# Patient Record
Sex: FEMALE | Race: BLACK OR AFRICAN AMERICAN | NOT HISPANIC OR LATINO | URBAN - METROPOLITAN AREA
[De-identification: names, ages, dates, MRNs, and addresses within clinical notes are randomized per-mention and may not be internally consistent; named-entity substitution may affect disease eponyms.]

---

## 2020-07-25 ENCOUNTER — TELEPHONE ENCOUNTER (OUTPATIENT)
Dept: URBAN - METROPOLITAN AREA CLINIC 13 | Facility: CLINIC | Age: 45
End: 2020-07-25

## 2020-07-25 RX ORDER — PANTOPRAZOLE SODIUM 40 MG/1
TAKE 1 TABLET BY MOUTH TWICE A DAY TABLET, DELAYED RELEASE ORAL
Qty: 60 | Refills: 11 | OUTPATIENT
Start: 2019-05-02 | End: 2019-05-30

## 2020-07-25 RX ORDER — PANTOPRAZOLE SODIUM 40 MG/1
TAKE 1 TABLET BY MOUTH TWICE A DAY TABLET, DELAYED RELEASE ORAL
Qty: 180 | Refills: 4 | OUTPATIENT
Start: 2019-05-30 | End: 2019-08-09

## 2020-07-25 RX ORDER — POLYETHYLENE GLYCOL 3350, SODIUM CHLORIDE, SODIUM BICARBONATE AND POTASSIUM CHLORIDE WITH LEMON FLAVOR 420; 11.2; 5.72; 1.48 G/4L; G/4L; G/4L; G/4L
TAKE 1/2 GALLON AT 5:00 PM DAY BEFORE PROCEDURE, TAKE SECOND 1/2 OF GALLON 6 HRS PRIOR TO PROCEDURE POWDER, FOR SOLUTION ORAL
Qty: 1 | Refills: 0 | OUTPATIENT
Start: 2018-10-05 | End: 2019-01-04

## 2020-07-25 RX ORDER — ONDANSETRON 8 MG/1
TAKE 1 MG EVERY 8 HOURS PRN NAUSEA TABLET, ORALLY DISINTEGRATING ORAL
Qty: 45 | Refills: 1 | OUTPATIENT
Start: 2018-06-06 | End: 2019-08-09

## 2020-07-25 RX ORDER — EPOETIN ALFA 20000 [IU]/ML
INJECT 0.5 ML 3 TIMES WEEKLY AS DIRECTED SOLUTION INTRAVENOUS; SUBCUTANEOUS
Refills: 0 | OUTPATIENT
Start: 2009-06-17 | End: 2019-08-09

## 2020-07-25 RX ORDER — POLYETHYLENE GLYCOL 3350, SODIUM CHLORIDE, SODIUM BICARBONATE AND POTASSIUM CHLORIDE WITH LEMON FLAVOR 420; 11.2; 5.72; 1.48 G/4L; G/4L; G/4L; G/4L
DRINK 32 OUNCES AT 5PM DAY BEFORE PROCEDURE AND 6 HOURS PRIOR POWDER, FOR SOLUTION ORAL
Qty: 1 | Refills: 0 | OUTPATIENT
Start: 2013-10-24 | End: 2015-11-20

## 2020-07-25 RX ORDER — DICYCLOMINE HYDROCHLORIDE 10 MG/1
TAKE 2 CAPSULE EVERY 6 HOURS PRN ABDOMINAL PAIN CAPSULE ORAL
Qty: 45 | Refills: 11 | OUTPATIENT
Start: 2015-11-20 | End: 2017-11-17

## 2020-07-25 RX ORDER — OMEPRAZOLE 40 MG/1
TAKE 1 CAPSULE TWICE DAILY BEFORE BREAKFAST AND SUPPER CAPSULE, DELAYED RELEASE ORAL
Qty: 60 | Refills: 11 | OUTPATIENT
Start: 2013-06-13 | End: 2019-08-09

## 2020-07-25 RX ORDER — DEXTROSE 4 G
TAKE 1 TABLET AT BEDTIME TABLET,CHEWABLE ORAL
Refills: 0 | OUTPATIENT
End: 2019-08-09

## 2020-07-25 RX ORDER — HYOSCYAMINE SULFATE 0.12 MG/1
PLACE 1 TABLET EVERY 6 HOURS PRN TABLET, ORALLY DISINTEGRATING ORAL
Qty: 50 | Refills: 1 | OUTPATIENT
Start: 2018-06-06 | End: 2019-01-04

## 2020-07-25 RX ORDER — PANTOPRAZOLE SODIUM 40 MG/1
TAKE 1 TABLET BY MOUTH EVERY DAY TABLET, DELAYED RELEASE ORAL
Qty: 30 | Refills: 11 | OUTPATIENT
Start: 2019-11-12 | End: 2019-12-04

## 2020-07-25 RX ORDER — FOLIC ACID/VIT B COMPLEX AND C 0.8 MG
TAKE AS DIRECTED TABLET ORAL
Refills: 0 | OUTPATIENT
End: 2015-11-20

## 2020-07-25 RX ORDER — SUCRALFATE 1 G/1
DISSOLVE 1 TABLET IN 1-2 TABLESPOONS OF WATER AND TAKE 30 MINUTES BEFORE MEALS AND AT BEDTIME TABLET ORAL
Qty: 60 | Refills: 3 | OUTPATIENT
Start: 2019-04-12 | End: 2019-08-09

## 2020-07-26 ENCOUNTER — TELEPHONE ENCOUNTER (OUTPATIENT)
Dept: URBAN - METROPOLITAN AREA CLINIC 13 | Facility: CLINIC | Age: 45
End: 2020-07-26

## 2020-07-26 RX ORDER — METHYLPREDNISOLONE 4 MG/1
TABLET ORAL
Qty: 21 | Refills: 0 | Status: ACTIVE | COMMUNITY
Start: 2019-01-03

## 2020-07-26 RX ORDER — FAMOTIDINE 40 MG/1
TAKE 1 TABLET BEDTIME TABLET ORAL
Qty: 90 | Refills: 3 | Status: ACTIVE | COMMUNITY
Start: 2019-11-12

## 2020-07-26 RX ORDER — DOXYCYCLINE 100 MG/1
CAPSULE ORAL
Qty: 20 | Refills: 0 | Status: ACTIVE | COMMUNITY
Start: 2013-12-30

## 2020-07-26 RX ORDER — HYDROCODONE BITARTRATE AND ACETAMINOPHEN 5; 325 MG/1; MG/1
TABLET ORAL
Qty: 15 | Refills: 0 | Status: ACTIVE | COMMUNITY
Start: 2019-02-05

## 2020-07-26 RX ORDER — PANTOPRAZOLE SODIUM 40 MG/1
TAKE 1 TABLET BY MOUTH EVERY DAY TABLET, DELAYED RELEASE ORAL
Qty: 90 | Refills: 4 | Status: ACTIVE | COMMUNITY
Start: 2019-12-04

## 2020-07-26 RX ORDER — OMEPRAZOLE 20 MG/1
CAPSULE, DELAYED RELEASE ORAL
Qty: 30 | Refills: 0 | Status: ACTIVE | COMMUNITY
Start: 2019-04-13

## 2020-07-26 RX ORDER — SEVELAMER CARBONATE 800 MG/1
TABLET, FILM COATED ORAL
Qty: 990 | Refills: 0 | Status: ACTIVE | COMMUNITY
Start: 2019-03-05

## 2020-07-26 RX ORDER — AZITHROMYCIN DIHYDRATE 250 MG/1
TABLET, FILM COATED ORAL
Qty: 6 | Refills: 0 | Status: ACTIVE | COMMUNITY
Start: 2014-12-11

## 2020-07-26 RX ORDER — CARVEDILOL 6.25 MG/1
TABLET, FILM COATED ORAL
Qty: 60 | Refills: 0 | Status: ACTIVE | COMMUNITY
Start: 2017-04-22

## 2020-07-26 RX ORDER — ZOLPIDEM TARTRATE 10 MG/1
TABLET, FILM COATED ORAL
Qty: 30 | Refills: 0 | Status: ACTIVE | COMMUNITY
Start: 2014-07-15

## 2020-07-26 RX ORDER — DEXTROSE 4 G
TABLET,CHEWABLE ORAL
Qty: 30 | Refills: 0 | Status: ACTIVE | COMMUNITY
Start: 2016-03-20

## 2020-07-26 RX ORDER — AMLODIPINE BESYLATE 10 MG/1
TAKE 1 TABLET DAILY TABLET ORAL
Refills: 0 | Status: ACTIVE | COMMUNITY

## 2020-07-26 RX ORDER — CARVEDILOL 6.25 MG/1
TABLET, FILM COATED ORAL
Qty: 60 | Refills: 0 | Status: ACTIVE | COMMUNITY
Start: 2017-03-26

## 2020-07-26 RX ORDER — BUTALBITAL, ACETAMINOPHEN, AND CAFFEINE 50; 325; 40 MG/1; MG/1; MG/1
TABLET ORAL
Qty: 30 | Refills: 0 | Status: ACTIVE | COMMUNITY
Start: 2017-12-21

## 2020-07-26 RX ORDER — ZOLPIDEM TARTRATE 10 MG/1
TABLET, FILM COATED ORAL
Qty: 30 | Refills: 0 | Status: ACTIVE | COMMUNITY
Start: 2018-06-29

## 2020-07-26 RX ORDER — ZOLPIDEM TARTRATE 10 MG/1
TABLET, FILM COATED ORAL
Qty: 30 | Refills: 0 | Status: ACTIVE | COMMUNITY
Start: 2019-07-29

## 2020-07-26 RX ORDER — SULFAMETHOXAZOLE AND TRIMETHOPRIM 800; 160 MG/1; MG/1
TABLET ORAL
Qty: 28 | Refills: 0 | Status: ACTIVE | COMMUNITY
Start: 2020-03-12

## 2020-07-26 RX ORDER — OMEPRAZOLE 20 MG/1
CAPSULE, DELAYED RELEASE ORAL
Qty: 30 | Refills: 0 | Status: ACTIVE | COMMUNITY
Start: 2018-05-16

## 2020-07-26 RX ORDER — CICLOPIROX 80 MG/ML
SOLUTION TOPICAL
Qty: 7 | Refills: 0 | Status: ACTIVE | COMMUNITY
Start: 2018-10-26

## 2020-07-26 RX ORDER — OMEPRAZOLE 20 MG/1
CAPSULE, DELAYED RELEASE ORAL
Qty: 30 | Refills: 0 | Status: ACTIVE | COMMUNITY
Start: 2018-04-20

## 2020-07-26 RX ORDER — ZOLPIDEM TARTRATE 10 MG/1
TABLET, FILM COATED ORAL
Qty: 30 | Refills: 0 | Status: ACTIVE | COMMUNITY
Start: 2018-03-05

## 2020-07-26 RX ORDER — METHYLPREDNISOLONE 4 MG/1
TABLET ORAL
Qty: 21 | Refills: 0 | Status: ACTIVE | COMMUNITY
Start: 2017-11-06

## 2020-07-26 RX ORDER — LORAZEPAM 0.5 MG/1
TABLET ORAL
Qty: 10 | Refills: 0 | Status: ACTIVE | COMMUNITY
Start: 2014-01-12

## 2020-07-26 RX ORDER — CARVEDILOL 6.25 MG/1
TABLET, FILM COATED ORAL
Qty: 30 | Refills: 0 | Status: ACTIVE | COMMUNITY
Start: 2015-01-19

## 2020-07-26 RX ORDER — SEVELAMER CARBONATE 800 MG/1
TABLET, FILM COATED ORAL
Qty: 1350 | Refills: 0 | Status: ACTIVE | COMMUNITY
Start: 2017-03-02

## 2020-07-26 RX ORDER — HYDROCODONE BITARTRATE AND ACETAMINOPHEN 5; 325 MG/1; MG/1
TABLET ORAL
Qty: 15 | Refills: 0 | Status: ACTIVE | COMMUNITY
Start: 2014-04-16

## 2020-07-26 RX ORDER — NEOMYCIN SULFATE, POLYMYXIN B SULFATE AND DEXAMETHASONE 3.5; 10000; 1 MG/ML; [USP'U]/ML; MG/ML
SUSPENSION/ DROPS OPHTHALMIC
Qty: 5 | Refills: 0 | Status: ACTIVE | COMMUNITY
Start: 2018-12-17

## 2020-07-26 RX ORDER — DOCUSATE SODIUM 100 MG/1
TAKE 1 CAPSULE BY MOUTH EVERY DAY CAPSULE, LIQUID FILLED ORAL
Qty: 30 | Refills: 0 | Status: ACTIVE | COMMUNITY
Start: 2019-10-08

## 2020-07-26 RX ORDER — PROMETHAZINE HYDROCHLORIDE 12.5 MG/1
TABLET ORAL
Qty: 60 | Refills: 0 | Status: ACTIVE | COMMUNITY
Start: 2018-06-11

## 2020-07-26 RX ORDER — FLUTICASONE PROPIONATE 50 UG/1
SPRAY, METERED NASAL
Qty: 16 | Refills: 0 | Status: ACTIVE | COMMUNITY
Start: 2016-06-10

## 2020-07-26 RX ORDER — AZITHROMYCIN DIHYDRATE 250 MG/1
TABLET, FILM COATED ORAL
Qty: 6 | Refills: 0 | Status: ACTIVE | COMMUNITY
Start: 2017-12-06

## 2020-07-26 RX ORDER — SEVELAMER CARBONATE 800 MG/1
TABLET, FILM COATED ORAL
Qty: 990 | Refills: 0 | Status: ACTIVE | COMMUNITY
Start: 2018-02-06

## 2020-07-26 RX ORDER — HYDROCODONE BITARTRATE AND ACETAMINOPHEN 5; 325 MG/1; MG/1
TABLET ORAL
Qty: 60 | Refills: 0 | Status: ACTIVE | COMMUNITY
Start: 2017-02-10

## 2020-07-26 RX ORDER — PROMETHAZINE HYDROCHLORIDE 25 MG/1
TABLET ORAL
Qty: 20 | Refills: 0 | Status: ACTIVE | COMMUNITY
Start: 2019-05-10

## 2020-07-26 RX ORDER — ZOLPIDEM TARTRATE 10 MG/1
TABLET, FILM COATED ORAL
Qty: 30 | Refills: 0 | Status: ACTIVE | COMMUNITY
Start: 2018-09-11

## 2020-07-26 RX ORDER — ZOLPIDEM TARTRATE 10 MG/1
TABLET, FILM COATED ORAL
Qty: 30 | Refills: 0 | Status: ACTIVE | COMMUNITY
Start: 2012-12-04

## 2020-07-26 RX ORDER — ZOLPIDEM TARTRATE 10 MG/1
TABLET, FILM COATED ORAL
Qty: 30 | Refills: 0 | Status: ACTIVE | COMMUNITY
Start: 2016-03-21

## 2020-07-26 RX ORDER — AZITHROMYCIN DIHYDRATE 250 MG/1
TABLET, FILM COATED ORAL
Qty: 5 | Refills: 0 | Status: ACTIVE | COMMUNITY
Start: 2018-12-20

## 2020-07-26 RX ORDER — SEVELAMER CARBONATE 800 MG/1
TABLET, FILM COATED ORAL
Qty: 450 | Refills: 0 | Status: ACTIVE | COMMUNITY
Start: 2016-04-04

## 2020-07-26 RX ORDER — BUTALBITAL, ACETAMINOPHEN, AND CAFFEINE 50; 325; 40 MG/1; MG/1; MG/1
TAKE 1 TO 2 TABLETS BY MOUTH EVERY 6 HOURS AS NEEDED FOR HEADACHE TABLET ORAL
Qty: 20 | Refills: 0 | Status: ACTIVE | COMMUNITY
Start: 2019-11-07

## 2020-07-26 RX ORDER — FLUTICASONE PROPIONATE 50 UG/1
SPRAY, METERED NASAL
Qty: 48 | Refills: 0 | Status: ACTIVE | COMMUNITY
Start: 2020-02-14

## 2020-07-26 RX ORDER — CARVEDILOL 6.25 MG/1
TABLET, FILM COATED ORAL
Qty: 60 | Refills: 0 | Status: ACTIVE | COMMUNITY
Start: 2017-05-21

## 2020-07-26 RX ORDER — ZOLPIDEM TARTRATE 10 MG/1
TABLET, FILM COATED ORAL
Qty: 30 | Refills: 0 | Status: ACTIVE | COMMUNITY
Start: 2016-01-11

## 2020-07-26 RX ORDER — NYSTATIN 100000 [USP'U]/G
OINTMENT TOPICAL
Qty: 30 | Refills: 0 | Status: ACTIVE | COMMUNITY
Start: 2019-05-10

## 2020-07-26 RX ORDER — ZOLPIDEM TARTRATE 10 MG/1
TABLET, FILM COATED ORAL
Qty: 30 | Refills: 0 | Status: ACTIVE | COMMUNITY
Start: 2013-10-16

## 2020-07-26 RX ORDER — TIZANIDINE 2 MG/1
TABLET ORAL
Qty: 30 | Refills: 0 | Status: ACTIVE | COMMUNITY
Start: 2019-05-17

## 2020-07-26 RX ORDER — BUTALBITAL, ACETAMINOPHEN, AND CAFFEINE 50; 325; 40 MG/1; MG/1; MG/1
TABLET ORAL
Qty: 15 | Refills: 0 | Status: ACTIVE | COMMUNITY
Start: 2015-01-19

## 2020-07-26 RX ORDER — HYDROCODONE BITARTRATE AND ACETAMINOPHEN 7.5; 325 MG/1; MG/1
TABLET ORAL
Qty: 16 | Refills: 0 | Status: ACTIVE | COMMUNITY
Start: 2014-11-14

## 2020-07-26 RX ORDER — DEXTROSE 4 G
TAKE 1 TABLET BY MOUTH EVERY DAY TABLET,CHEWABLE ORAL
Qty: 30 | Refills: 0 | Status: ACTIVE | COMMUNITY
Start: 2018-12-31

## 2020-07-26 RX ORDER — AZITHROMYCIN DIHYDRATE 250 MG/1
TABLET, FILM COATED ORAL
Qty: 6 | Refills: 0 | Status: ACTIVE | COMMUNITY
Start: 2014-01-03

## 2020-07-26 RX ORDER — OMEPRAZOLE 20 MG/1
CAPSULE, DELAYED RELEASE ORAL
Qty: 30 | Refills: 0 | Status: ACTIVE | COMMUNITY
Start: 2019-03-15

## 2020-07-26 RX ORDER — ZOLPIDEM TARTRATE 10 MG/1
TABLET, FILM COATED ORAL
Qty: 30 | Refills: 0 | Status: ACTIVE | COMMUNITY
Start: 2018-02-02

## 2020-07-26 RX ORDER — CICLOPIROX 80 MG/ML
SOLUTION TOPICAL
Qty: 7 | Refills: 0 | Status: ACTIVE | COMMUNITY
Start: 2018-09-11

## 2020-07-26 RX ORDER — TRAMADOL HYDROCHLORIDE 50 MG/1
TABLET ORAL
Qty: 20 | Refills: 0 | Status: ACTIVE | COMMUNITY
Start: 2017-08-28

## 2020-07-26 RX ORDER — ONDANSETRON 4 MG/1
TABLET, ORALLY DISINTEGRATING ORAL
Qty: 14 | Refills: 0 | Status: ACTIVE | COMMUNITY
Start: 2019-11-07

## 2020-07-26 RX ORDER — FLUTICASONE PROPIONATE 50 UG/1
SPRAY, METERED NASAL
Qty: 16 | Refills: 0 | Status: ACTIVE | COMMUNITY
Start: 2014-01-03

## 2020-07-26 RX ORDER — CLOTRIMAZOLE AND BETAMETHASONE DIPROPIONATE 10; .5 MG/G; MG/G
CREAM TOPICAL
Qty: 45 | Refills: 0 | Status: ACTIVE | COMMUNITY
Start: 2014-09-03

## 2020-07-26 RX ORDER — ZOLPIDEM TARTRATE 10 MG/1
TABLET, FILM COATED ORAL
Qty: 30 | Refills: 0 | Status: ACTIVE | COMMUNITY
Start: 2018-12-14

## 2020-07-26 RX ORDER — FLUTICASONE PROPIONATE 50 UG/1
SPRAY, METERED NASAL
Qty: 16 | Refills: 0 | Status: ACTIVE | COMMUNITY
Start: 2019-01-12

## 2020-07-26 RX ORDER — TIZANIDINE 2 MG/1
TABLET ORAL
Qty: 30 | Refills: 0 | Status: ACTIVE | COMMUNITY
Start: 2019-07-14

## 2020-07-26 RX ORDER — ZOLPIDEM TARTRATE 10 MG/1
TABLET, FILM COATED ORAL
Qty: 30 | Refills: 0 | Status: ACTIVE | COMMUNITY
Start: 2018-04-20

## 2020-07-26 RX ORDER — NORTRIPTYLINE HYDROCHLORIDE 10 MG/1
CAPSULE ORAL
Qty: 90 | Refills: 0 | Status: ACTIVE | COMMUNITY
Start: 2017-07-13

## 2020-07-26 RX ORDER — ZOLPIDEM TARTRATE 10 MG/1
TABLET, FILM COATED ORAL
Qty: 30 | Refills: 0 | Status: ACTIVE | COMMUNITY
Start: 2017-03-27

## 2020-07-26 RX ORDER — OMEPRAZOLE 20 MG/1
CAPSULE, DELAYED RELEASE ORAL
Qty: 30 | Refills: 0 | Status: ACTIVE | COMMUNITY
Start: 2017-12-19

## 2020-07-26 RX ORDER — CEFDINIR 300 MG/1
CAPSULE ORAL
Qty: 20 | Refills: 0 | Status: ACTIVE | COMMUNITY
Start: 2019-01-03

## 2020-07-26 RX ORDER — FLUTICASONE PROPIONATE 50 UG/1
SPRAY, METERED NASAL
Qty: 16 | Refills: 0 | Status: ACTIVE | COMMUNITY
Start: 2019-12-06

## 2020-07-26 RX ORDER — HYDROCORTISONE 1 %
CREAM (GRAM) TOPICAL
Qty: 225 | Refills: 0 | Status: ACTIVE | COMMUNITY
Start: 2014-11-26

## 2020-07-26 RX ORDER — SEVELAMER CARBONATE 800 MG/1
TABLET, FILM COATED ORAL
Qty: 990 | Refills: 0 | Status: ACTIVE | COMMUNITY
Start: 2018-08-08

## 2020-07-26 RX ORDER — CLINDAMYCIN HYDROCHLORIDE 300 MG/1
CAPSULE ORAL
Qty: 21 | Refills: 0 | Status: ACTIVE | COMMUNITY
Start: 2014-10-08

## 2020-07-26 RX ORDER — DOXYCYCLINE 100 MG/1
CAPSULE ORAL
Qty: 20 | Refills: 0 | Status: ACTIVE | COMMUNITY
Start: 2013-02-21

## 2020-07-26 RX ORDER — TRAMADOL HYDROCHLORIDE 50 MG/1
TABLET ORAL
Qty: 10 | Refills: 0 | Status: ACTIVE | COMMUNITY
Start: 2019-05-10

## 2020-07-26 RX ORDER — PROMETHAZINE HYDROCHLORIDE 25 MG/1
TABLET ORAL
Qty: 10 | Refills: 0 | Status: ACTIVE | COMMUNITY
Start: 2017-12-21

## 2020-07-26 RX ORDER — FLUCONAZOLE 150 MG/1
TABLET ORAL
Qty: 1 | Refills: 0 | Status: ACTIVE | COMMUNITY
Start: 2013-02-22

## 2020-07-26 RX ORDER — ZOLPIDEM TARTRATE 10 MG/1
TABLET, FILM COATED ORAL
Qty: 30 | Refills: 0 | Status: ACTIVE | COMMUNITY
Start: 2018-05-26

## 2020-07-26 RX ORDER — FLUTICASONE PROPIONATE 50 UG/1
SPRAY, METERED NASAL
Qty: 16 | Refills: 0 | Status: ACTIVE | COMMUNITY
Start: 2018-12-20

## 2020-07-26 RX ORDER — AZITHROMYCIN DIHYDRATE 250 MG/1
TABLET, FILM COATED ORAL
Qty: 5 | Refills: 0 | Status: ACTIVE | COMMUNITY
Start: 2018-12-12

## 2020-07-26 RX ORDER — ZOLPIDEM TARTRATE 10 MG/1
TABLET, FILM COATED ORAL
Qty: 30 | Refills: 0 | Status: ACTIVE | COMMUNITY
Start: 2018-10-09

## 2020-07-26 RX ORDER — CARVEDILOL 6.25 MG/1
TABLET, FILM COATED ORAL
Qty: 60 | Refills: 0 | Status: ACTIVE | COMMUNITY
Start: 2016-03-16

## 2020-07-26 RX ORDER — SEVELAMER CARBONATE 800 MG/1
TAKE 1 TABLET DAILY TABLET, FILM COATED ORAL
Refills: 0 | Status: ACTIVE | COMMUNITY
Start: 2020-03-10

## 2020-07-26 RX ORDER — MELOXICAM 7.5 MG/1
TABLET ORAL
Qty: 30 | Refills: 0 | Status: ACTIVE | COMMUNITY
Start: 2018-08-24

## 2020-07-26 RX ORDER — PROMETHAZINE HYDROCHLORIDE AND DEXTROMETHORPHAN HYDROBROMIDE 6.25; 15 MG/5ML; MG/5ML
TAKE 5 MLS (ONE TEASPOONFUL) BY MOUTH EVERY 6 HOURS FOR 7 DAYS SOLUTION ORAL
Qty: 140 | Refills: 0 | Status: ACTIVE | COMMUNITY
Start: 2018-12-12

## 2020-07-26 RX ORDER — MUPIROCIN 20 MG/G
ONCE DAILY OINTMENT TOPICAL
Refills: 0 | Status: ACTIVE | COMMUNITY
Start: 2020-02-25

## 2020-07-26 RX ORDER — CARVEDILOL 6.25 MG/1
TABLET, FILM COATED ORAL
Qty: 60 | Refills: 0 | Status: ACTIVE | COMMUNITY
Start: 2017-09-25

## 2020-07-26 RX ORDER — NORTRIPTYLINE HYDROCHLORIDE 10 MG/1
TAKE 1 CAPSULE DAILY CAPSULE ORAL
Qty: 30 | Refills: 1 | Status: ACTIVE | COMMUNITY
Start: 2019-04-30

## 2020-07-26 RX ORDER — PROMETHAZINE HYDROCHLORIDE 12.5 MG/1
TAKE ONE TABLET BY MOUTH EVERY 12 HOURS AS NEEDED FOR NAUSEA TABLET ORAL
Qty: 45 | Refills: 3 | Status: ACTIVE | COMMUNITY
Start: 2019-11-12

## 2020-07-26 RX ORDER — SEVELAMER CARBONATE 800 MG/1
TABLET, FILM COATED ORAL
Qty: 990 | Refills: 0 | Status: ACTIVE | COMMUNITY
Start: 2018-12-13

## 2020-07-26 RX ORDER — OSELTAMIVIR PHOSPHATE 6 MG/ML
POWDER, FOR SUSPENSION ORAL
Qty: 60 | Refills: 0 | Status: ACTIVE | COMMUNITY
Start: 2013-02-27

## 2020-07-26 RX ORDER — SEVELAMER CARBONATE 800 MG/1
TABLET, FILM COATED ORAL
Qty: 450 | Refills: 0 | Status: ACTIVE | COMMUNITY
Start: 2016-01-18

## 2020-07-26 RX ORDER — FLUTICASONE PROPIONATE 50 UG/1
SPRAY, METERED NASAL
Qty: 16 | Refills: 0 | Status: ACTIVE | COMMUNITY
Start: 2014-12-11

## 2020-07-26 RX ORDER — CODEINE PHOSPHATE AND GUAIFENESIN 10; 100 MG/5ML; MG/5ML
TAKE 2 TEASPOONSFUL BY MOUTH AT BEDTIME AS NEEDED SOLUTION ORAL
Qty: 120 | Refills: 0 | Status: ACTIVE | COMMUNITY
Start: 2017-12-06

## 2020-07-26 RX ORDER — NAPROXEN 500 MG/1
TABLET ORAL
Qty: 30 | Refills: 0 | Status: ACTIVE | COMMUNITY
Start: 2016-12-26

## 2020-07-26 RX ORDER — NORTRIPTYLINE HYDROCHLORIDE 10 MG/1
CAPSULE ORAL
Qty: 90 | Refills: 0 | Status: ACTIVE | COMMUNITY
Start: 2017-10-08

## 2020-07-26 RX ORDER — CARVEDILOL 6.25 MG/1
TABLET, FILM COATED ORAL
Qty: 60 | Refills: 0 | Status: ACTIVE | COMMUNITY
Start: 2016-02-09

## 2020-07-26 RX ORDER — HYDROCODONE BITARTRATE AND ACETAMINOPHEN 5; 325 MG/1; MG/1
TAKE 1 TABLET EVERY 6 HOURS AS NEEDED FOR PAIN TABLET ORAL
Refills: 0 | Status: ACTIVE | COMMUNITY
Start: 2020-03-18

## 2020-07-26 RX ORDER — CEPHALEXIN 500 MG/1
CAPSULE ORAL
Qty: 28 | Refills: 0 | Status: ACTIVE | COMMUNITY
Start: 2014-04-16

## 2020-07-26 RX ORDER — SEVELAMER CARBONATE 800 MG/1
TABLET, FILM COATED ORAL
Qty: 990 | Refills: 0 | Status: ACTIVE | COMMUNITY
Start: 2017-11-13

## 2020-07-26 RX ORDER — LINACLOTIDE 290 UG/1
TAKE 1 CAPSULE EVERY MORNING 30 MINUTES BEFORE BREAKFAST CAPSULE, GELATIN COATED ORAL
Qty: 90 | Refills: 3 | Status: ACTIVE | COMMUNITY
Start: 2016-04-11

## 2020-07-26 RX ORDER — CARVEDILOL 6.25 MG/1
TABLET, FILM COATED ORAL
Qty: 60 | Refills: 0 | Status: ACTIVE | COMMUNITY
Start: 2016-01-11

## 2020-07-26 RX ORDER — MELOXICAM 7.5 MG/1
TABLET ORAL
Qty: 30 | Refills: 0 | Status: ACTIVE | COMMUNITY
Start: 2018-06-20

## 2020-07-26 RX ORDER — NORTRIPTYLINE HYDROCHLORIDE 10 MG/1
CAPSULE ORAL
Qty: 30 | Refills: 0 | Status: ACTIVE | COMMUNITY
Start: 2015-05-29

## 2020-07-26 RX ORDER — CINACALCET HYDROCHLORIDE 30 MG/1
TABLET, COATED ORAL
Qty: 20 | Refills: 0 | Status: ACTIVE | COMMUNITY
Start: 2017-12-07

## 2020-07-26 RX ORDER — ZOLPIDEM TARTRATE 10 MG/1
TABLET, FILM COATED ORAL
Qty: 30 | Refills: 0 | Status: ACTIVE | COMMUNITY
Start: 2017-04-25

## 2020-07-26 RX ORDER — OMEPRAZOLE 20 MG/1
CAPSULE, DELAYED RELEASE ORAL
Qty: 30 | Refills: 0 | Status: ACTIVE | COMMUNITY
Start: 2018-06-25

## 2020-12-17 ENCOUNTER — ERX REFILL RESPONSE (OUTPATIENT)
Age: 45
End: 2020-12-17

## 2020-12-17 RX ORDER — LINACLOTIDE 290 UG/1
TAKE 1 CAPSULE EVERY MORNING 30 MINUTES BEFORE BREAKFAST CAPSULE, GELATIN COATED ORAL
Qty: 90 | Refills: 0

## 2021-01-15 ENCOUNTER — ERX REFILL RESPONSE (OUTPATIENT)
Age: 46
End: 2021-01-15

## 2021-01-15 RX ORDER — PANTOPRAZOLE SODIUM 40 MG/1
TAKE 1 TABLET BY MOUTH EVERY DAY TABLET, DELAYED RELEASE ORAL
Qty: 90 | Refills: 3

## 2021-01-15 RX ORDER — PROMETHAZINE HYDROCHLORIDE 12.5 MG/1
TAKE ONE TABLET BY MOUTH EVERY 12 HOURS AS NEEDED FOR NAUSEA TABLET ORAL
Qty: 45 | Refills: 3

## 2021-07-28 ENCOUNTER — OFFICE VISIT (OUTPATIENT)
Dept: URBAN - METROPOLITAN AREA CLINIC 113 | Facility: CLINIC | Age: 46
End: 2021-07-28

## 2021-08-31 ENCOUNTER — OFFICE VISIT (OUTPATIENT)
Dept: URBAN - METROPOLITAN AREA CLINIC 113 | Facility: CLINIC | Age: 46
End: 2021-08-31

## 2021-09-24 ENCOUNTER — WEB ENCOUNTER (OUTPATIENT)
Dept: URBAN - METROPOLITAN AREA CLINIC 113 | Facility: CLINIC | Age: 46
End: 2021-09-24

## 2021-09-24 ENCOUNTER — OFFICE VISIT (OUTPATIENT)
Dept: URBAN - METROPOLITAN AREA CLINIC 113 | Facility: CLINIC | Age: 46
End: 2021-09-24
Payer: MEDICARE

## 2021-09-24 VITALS
BODY MASS INDEX: 33.34 KG/M2 | TEMPERATURE: 97.5 F | HEART RATE: 69 BPM | HEIGHT: 68 IN | WEIGHT: 220 LBS | DIASTOLIC BLOOD PRESSURE: 73 MMHG | SYSTOLIC BLOOD PRESSURE: 170 MMHG

## 2021-09-24 DIAGNOSIS — K59.01 SLOW TRANSIT CONSTIPATION: ICD-10-CM

## 2021-09-24 DIAGNOSIS — K21.9 GERD: ICD-10-CM

## 2021-09-24 DIAGNOSIS — R13.14 PHARYNGOESOPHAGEAL DYSPHAGIA: ICD-10-CM

## 2021-09-24 PROCEDURE — 99214 OFFICE O/P EST MOD 30 MIN: CPT | Performed by: NURSE PRACTITIONER

## 2021-09-24 RX ORDER — NYSTATIN 100000 [USP'U]/G
OINTMENT TOPICAL
Qty: 30 | Refills: 0 | Status: ON HOLD | COMMUNITY
Start: 2019-05-10

## 2021-09-24 RX ORDER — SULFAMETHOXAZOLE AND TRIMETHOPRIM 800; 160 MG/1; MG/1
TABLET ORAL
Qty: 28 | Refills: 0 | Status: ON HOLD | COMMUNITY
Start: 2020-03-12

## 2021-09-24 RX ORDER — LINACLOTIDE 290 UG/1
1 CAPSULE AT LEAST 30 MINUTES BEFORE THE FIRST MEAL OF THE DAY ON AN EMPTY STOMACH CAPSULE, GELATIN COATED ORAL ONCE A DAY
Qty: 90 | Refills: 3 | OUTPATIENT
Start: 2021-09-24 | End: 2022-09-19

## 2021-09-24 RX ORDER — CODEINE PHOSPHATE AND GUAIFENESIN 10; 100 MG/5ML; MG/5ML
TAKE 2 TEASPOONSFUL BY MOUTH AT BEDTIME AS NEEDED SOLUTION ORAL
Qty: 120 | Refills: 0 | Status: ON HOLD | COMMUNITY
Start: 2017-12-06

## 2021-09-24 RX ORDER — LINACLOTIDE 290 UG/1
TAKE 1 CAPSULE EVERY MORNING 30 MINUTES BEFORE BREAKFAST CAPSULE, GELATIN COATED ORAL
Qty: 90 | Refills: 0 | Status: ACTIVE | COMMUNITY

## 2021-09-24 RX ORDER — FAMOTIDINE 40 MG/1
1 TABLET AT BEDTIME TABLET, FILM COATED ORAL ONCE A DAY
Qty: 90 TABLETS | Refills: 3 | OUTPATIENT
Start: 2021-09-24

## 2021-09-24 RX ORDER — LORAZEPAM 0.5 MG/1
TABLET ORAL
Qty: 10 | Refills: 0 | Status: ON HOLD | COMMUNITY
Start: 2014-01-12

## 2021-09-24 RX ORDER — HYDROCORTISONE 1 %
CREAM (GRAM) TOPICAL
Qty: 225 | Refills: 0 | Status: ON HOLD | COMMUNITY
Start: 2014-11-26

## 2021-09-24 RX ORDER — TIZANIDINE 2 MG/1
TABLET ORAL
Qty: 30 | Refills: 0 | Status: ON HOLD | COMMUNITY
Start: 2019-05-17

## 2021-09-24 RX ORDER — HYDROCODONE BITARTRATE AND ACETAMINOPHEN 5; 325 MG/1; MG/1
TABLET ORAL
Qty: 15 | Refills: 0 | Status: ON HOLD | COMMUNITY
Start: 2014-04-16

## 2021-09-24 RX ORDER — OSELTAMIVIR PHOSPHATE 6 MG/ML
POWDER, FOR SUSPENSION ORAL
Qty: 60 | Refills: 0 | Status: ON HOLD | COMMUNITY
Start: 2013-02-27

## 2021-09-24 RX ORDER — CLOTRIMAZOLE AND BETAMETHASONE DIPROPIONATE 10; .5 MG/G; MG/G
CREAM TOPICAL
Qty: 45 | Refills: 0 | Status: ON HOLD | COMMUNITY
Start: 2014-09-03

## 2021-09-24 RX ORDER — HYDROCODONE BITARTRATE AND ACETAMINOPHEN 7.5; 325 MG/1; MG/1
TABLET ORAL
Qty: 16 | Refills: 0 | Status: ON HOLD | COMMUNITY
Start: 2014-11-14

## 2021-09-24 RX ORDER — PROMETHAZINE HYDROCHLORIDE 12.5 MG/1
TAKE ONE TABLET BY MOUTH EVERY 12 HOURS AS NEEDED FOR NAUSEA TABLET ORAL
Qty: 45 | Refills: 3 | Status: ON HOLD | COMMUNITY

## 2021-09-24 RX ORDER — NAPROXEN 500 MG/1
TABLET ORAL
Qty: 30 | Refills: 0 | Status: ON HOLD | COMMUNITY
Start: 2016-12-26

## 2021-09-24 RX ORDER — BUTALBITAL, ACETAMINOPHEN, AND CAFFEINE 50; 325; 40 MG/1; MG/1; MG/1
TABLET ORAL
Qty: 15 | Refills: 0 | Status: ON HOLD | COMMUNITY
Start: 2015-01-19

## 2021-09-24 RX ORDER — FLUTICASONE PROPIONATE 50 UG/1
SPRAY, METERED NASAL
Qty: 16 | Refills: 0 | Status: ON HOLD | COMMUNITY
Start: 2014-01-03

## 2021-09-24 RX ORDER — CARVEDILOL 6.25 MG/1
TABLET, FILM COATED ORAL
Qty: 30 | Refills: 0 | Status: ON HOLD | COMMUNITY
Start: 2015-01-19

## 2021-09-24 RX ORDER — NEOMYCIN SULFATE, POLYMYXIN B SULFATE AND DEXAMETHASONE 3.5; 10000; 1 MG/ML; [USP'U]/ML; MG/ML
SUSPENSION/ DROPS OPHTHALMIC
Qty: 5 | Refills: 0 | Status: ON HOLD | COMMUNITY
Start: 2018-12-17

## 2021-09-24 RX ORDER — DOXYCYCLINE 100 MG/1
CAPSULE ORAL
Qty: 20 | Refills: 0 | Status: ON HOLD | COMMUNITY
Start: 2013-02-21

## 2021-09-24 RX ORDER — CINACALCET HYDROCHLORIDE 30 MG/1
TABLET, COATED ORAL
Qty: 20 | Refills: 0 | Status: ON HOLD | COMMUNITY
Start: 2017-12-07

## 2021-09-24 RX ORDER — NORTRIPTYLINE HYDROCHLORIDE 10 MG/1
CAPSULE ORAL
Qty: 30 | Refills: 0 | Status: ON HOLD | COMMUNITY
Start: 2015-05-29

## 2021-09-24 RX ORDER — CEPHALEXIN 500 MG/1
CAPSULE ORAL
Qty: 28 | Refills: 0 | Status: ON HOLD | COMMUNITY
Start: 2014-04-16

## 2021-09-24 RX ORDER — FLUCONAZOLE 150 MG/1
TABLET ORAL
Qty: 1 | Refills: 0 | Status: ON HOLD | COMMUNITY
Start: 2013-02-22

## 2021-09-24 RX ORDER — TRAMADOL HYDROCHLORIDE 50 MG/1
TABLET ORAL
Qty: 20 | Refills: 0 | Status: ON HOLD | COMMUNITY
Start: 2017-08-28

## 2021-09-24 RX ORDER — ZOLPIDEM TARTRATE 10 MG/1
TABLET, FILM COATED ORAL
Qty: 30 | Refills: 0 | Status: ON HOLD | COMMUNITY
Start: 2012-12-04

## 2021-09-24 RX ORDER — OMEPRAZOLE 20 MG/1
CAPSULE, DELAYED RELEASE ORAL
Qty: 30 | Refills: 0 | Status: ON HOLD | COMMUNITY
Start: 2017-12-19

## 2021-09-24 RX ORDER — METHYLPREDNISOLONE 4 MG/1
TABLET ORAL
Qty: 21 | Refills: 0 | Status: ON HOLD | COMMUNITY
Start: 2017-11-06

## 2021-09-24 RX ORDER — MELOXICAM 7.5 MG/1
TABLET ORAL
Qty: 30 | Refills: 0 | Status: ON HOLD | COMMUNITY
Start: 2018-06-20

## 2021-09-24 RX ORDER — MUPIROCIN 20 MG/G
ONCE DAILY OINTMENT TOPICAL
Refills: 0 | Status: ON HOLD | COMMUNITY
Start: 2020-02-25

## 2021-09-24 RX ORDER — CLINDAMYCIN HYDROCHLORIDE 300 MG/1
CAPSULE ORAL
Qty: 21 | Refills: 0 | Status: ON HOLD | COMMUNITY
Start: 2014-10-08

## 2021-09-24 RX ORDER — ONDANSETRON 4 MG/1
TABLET, ORALLY DISINTEGRATING ORAL
Qty: 14 | Refills: 0 | Status: ON HOLD | COMMUNITY
Start: 2019-11-07

## 2021-09-24 RX ORDER — DEXTROSE 4 G
TABLET,CHEWABLE ORAL
Qty: 30 | Refills: 0 | Status: ON HOLD | COMMUNITY
Start: 2016-03-20

## 2021-09-24 RX ORDER — DOCUSATE SODIUM 100 MG/1
TAKE 1 CAPSULE BY MOUTH EVERY DAY CAPSULE, LIQUID FILLED ORAL
Qty: 30 | Refills: 0 | Status: ON HOLD | COMMUNITY
Start: 2019-10-08

## 2021-09-24 RX ORDER — PROMETHAZINE HYDROCHLORIDE 25 MG/1
TABLET ORAL
Qty: 10 | Refills: 0 | Status: ON HOLD | COMMUNITY
Start: 2017-12-21

## 2021-09-24 RX ORDER — CICLOPIROX 80 MG/ML
SOLUTION TOPICAL
Qty: 7 | Refills: 0 | Status: ON HOLD | COMMUNITY
Start: 2018-09-11

## 2021-09-24 RX ORDER — PANTOPRAZOLE SODIUM 40 MG/1
TAKE 1 TABLET DAILY TABLET, DELAYED RELEASE ORAL ONCE A DAY
Qty: 90 | Refills: 3 | OUTPATIENT
Start: 2021-09-24

## 2021-09-24 RX ORDER — CEFDINIR 300 MG/1
CAPSULE ORAL
Qty: 20 | Refills: 0 | Status: ON HOLD | COMMUNITY
Start: 2019-01-03

## 2021-09-24 RX ORDER — PROMETHAZINE HYDROCHLORIDE AND DEXTROMETHORPHAN HYDROBROMIDE 6.25; 15 MG/5ML; MG/5ML
TAKE 5 MLS (ONE TEASPOONFUL) BY MOUTH EVERY 6 HOURS FOR 7 DAYS SOLUTION ORAL
Qty: 140 | Refills: 0 | Status: ON HOLD | COMMUNITY
Start: 2018-12-12

## 2021-09-24 RX ORDER — PANTOPRAZOLE SODIUM 40 MG/1
TAKE 1 TABLET BY MOUTH EVERY DAY TABLET, DELAYED RELEASE ORAL
Qty: 90 | Refills: 3 | Status: ACTIVE | COMMUNITY

## 2021-09-24 RX ORDER — FAMOTIDINE 40 MG/1
TAKE 1 TABLET BEDTIME TABLET ORAL
Qty: 90 | Refills: 3 | Status: ON HOLD | COMMUNITY
Start: 2019-11-12

## 2021-09-24 RX ORDER — AMLODIPINE BESYLATE 10 MG/1
TAKE 1 TABLET DAILY TABLET ORAL
Refills: 0 | Status: ACTIVE | COMMUNITY

## 2021-09-24 RX ORDER — SEVELAMER CARBONATE 800 MG/1
TABLET, FILM COATED ORAL
Qty: 450 | Refills: 0 | Status: ACTIVE | COMMUNITY
Start: 2016-01-18

## 2021-09-24 RX ORDER — AZITHROMYCIN DIHYDRATE 250 MG/1
TABLET, FILM COATED ORAL
Qty: 6 | Refills: 0 | Status: ON HOLD | COMMUNITY
Start: 2014-01-03

## 2021-09-24 NOTE — HPI-TODAY'S VISIT:
Ms. Mixon is a 46-year-old female with a history of myelodysplastic syndrome, anemia on Epogen, ESRD on hemodialysis, GERD, Worthy's esophagus, gastroparesis, and chronic constipation, presenting for evaluation of dysphagia.  She was last seen 3/20/20 for routine follow-up. Her dysphagia had resolved following empiric esophageal dilation. Her reflux was managed with pantoprazole and chronic constipation controlled with Linzess. She was to continue to follow with hematology and cardiology for secondary hemochromatosis.  Within the last several months, she has experienced trouble swallowing. She indicates solid food will become hung midchest. If she attempts to take a sip of water, she will regurgitate. Her reflux is fairly well managed with pantoprazole. She has breakthrough heartburn 3-5x per month. She complains of occasional voice hoarseness and questions if this may be related to her reflux. She does have issues with postnasal drip, which is followed by ENT. She utilizes two nasal sprays. She reports infrequent abdominal pain and denies nausea or vomiting. Her constipation is managed with Linzess. No blood per rectum. She continues to follow with hematology, Dr. Cervantes. She has her hemoglobin checked weekly at dialysis.

## 2021-09-24 NOTE — HPI-OTHER HISTORIES
EGD normal on 6/5/19. The esophagus was empirically dilated to 48F. Echocardiogram on 5/22/19 showed LV EF 60%, mIld increase in left ventricular wall thickness, mild aortic regurgitation, mild tricuspid regurgitation, and trace mitral valve regurgitation. CT a/p without IV contrast on 5/10/19 showed a 2.3cm density mass within the right colon likely representing a lipoma, mild fatty infiltration throughout the colon walls, a punctate nonobstructing stone within the left kidney, numerous bilateral renal cysts within the kidneys with severe atrophy of the kidneys consistent with polycystic kidney disease, sclerosis of the bones likely due to renal osteodystrophy, and cardiomegaly. Labs 5/5/19: H/H 9.3/28.4, MCV 95.9, Plt 265, WBC 4.5. CMP unremarkable aside from BUN/Creat 24/8.11 and glucose 155. Amylase 121, lipase 470. Iron 131, TIBC 197. Ferritin 1851. . Haptoglobin 6. Normal hemoglobin electrophoresis. UGI with KUB was performed on 4/24/19 revealing mild esophageal narrowing at the level of C4-C6 and mild gastroesophageal reflux to the level of the thoracic inlet. Colonoscopy 12/5/18: BBPS score equals 8; large 30 mm lipoma at the hepatic flexure. Mild internal hemorrhoids. Minimal pancolonic diverticulosis. Colonoscopy otherwise normal to the cecum. CT of the abdomen and pelvis without contrast on 6/13/18 revealed significant cardiomegaly, question of hemochromatosis with tense ventricular walls, incidental focal calcification of papillary muscle, barely detectable 25 cc of pericardial effusion less than prior, possible hemochromatosis with liver measuring 80 Hounsfield units and walls of the heart relatively clearly defined as denser than the chambers as additional evidence (target organ), hepatosplenomegaly, atrophic polycystic kidneys, cholecystectomy, and hysterectomy. No bowel abnormalities. EGD 1/10/18 : Mild benign-appearing intrinsic stenosis at 39 cm status post dilatation to 48 Somali, granular mucosa at the GE junction, normal stomach and normal examined duodenum. GE junction biopsies showed Worthy's mucosa without dysplasia. Barium swallow 11/27/17 : some dysmotility of esophagus with some mild tertiary contractions and diminished peristalsis. Mildly dilated thoracic esophagus. Mild narrowing at the esophagogastric junction. Barium pill displayed mild stasis at GEJ but then passed rapidly into stomach. Capsule endoscopy 6/13/16 : normal. SB series 4/27/16 : No dilated bowel, mucosal thickening or obstruction. Transit time into the colon was 1 hour. Unremarkable study

## 2021-09-27 PROBLEM — 35298007 SLOW TRANSIT CONSTIPATION: Status: ACTIVE | Noted: 2021-09-24

## 2021-09-27 PROBLEM — 235595009 GASTROESOPHAGEAL REFLUX DISEASE: Status: ACTIVE | Noted: 2021-09-24

## 2021-09-27 PROBLEM — 40739000 DYSPHAGIA: Status: ACTIVE | Noted: 2021-09-24

## 2021-10-08 ENCOUNTER — OFFICE VISIT (OUTPATIENT)
Dept: URBAN - METROPOLITAN AREA MEDICAL CENTER 19 | Facility: MEDICAL CENTER | Age: 46
End: 2021-10-08
Payer: MEDICARE

## 2021-10-08 DIAGNOSIS — K29.60 ALKALINE REFLUX GASTRITIS: ICD-10-CM

## 2021-10-08 DIAGNOSIS — R13.19: ICD-10-CM

## 2021-10-08 DIAGNOSIS — K22.2 ESOPHAGEAL OBSTRUCTION: ICD-10-CM

## 2021-10-08 PROCEDURE — 43239 EGD BIOPSY SINGLE/MULTIPLE: CPT | Performed by: INTERNAL MEDICINE

## 2021-10-08 PROCEDURE — 43248 EGD GUIDE WIRE INSERTION: CPT | Performed by: INTERNAL MEDICINE

## 2021-10-08 RX ORDER — FLUTICASONE PROPIONATE 50 UG/1
SPRAY, METERED NASAL
Qty: 16 | Refills: 0 | Status: ON HOLD | COMMUNITY
Start: 2014-01-03

## 2021-10-08 RX ORDER — TRAMADOL HYDROCHLORIDE 50 MG/1
TABLET ORAL
Qty: 20 | Refills: 0 | Status: ON HOLD | COMMUNITY
Start: 2017-08-28

## 2021-10-08 RX ORDER — LORAZEPAM 0.5 MG/1
TABLET ORAL
Qty: 10 | Refills: 0 | Status: ON HOLD | COMMUNITY
Start: 2014-01-12

## 2021-10-08 RX ORDER — SULFAMETHOXAZOLE AND TRIMETHOPRIM 800; 160 MG/1; MG/1
TABLET ORAL
Qty: 28 | Refills: 0 | Status: ON HOLD | COMMUNITY
Start: 2020-03-12

## 2021-10-08 RX ORDER — CARVEDILOL 6.25 MG/1
TABLET, FILM COATED ORAL
Qty: 30 | Refills: 0 | Status: ON HOLD | COMMUNITY
Start: 2015-01-19

## 2021-10-08 RX ORDER — CICLOPIROX 80 MG/ML
SOLUTION TOPICAL
Qty: 7 | Refills: 0 | Status: ON HOLD | COMMUNITY
Start: 2018-09-11

## 2021-10-08 RX ORDER — DOCUSATE SODIUM 100 MG/1
TAKE 1 CAPSULE BY MOUTH EVERY DAY CAPSULE, LIQUID FILLED ORAL
Qty: 30 | Refills: 0 | Status: ON HOLD | COMMUNITY
Start: 2019-10-08

## 2021-10-08 RX ORDER — SEVELAMER CARBONATE 800 MG/1
TABLET, FILM COATED ORAL
Qty: 450 | Refills: 0 | Status: ACTIVE | COMMUNITY
Start: 2016-01-18

## 2021-10-08 RX ORDER — CLOTRIMAZOLE AND BETAMETHASONE DIPROPIONATE 10; .5 MG/G; MG/G
CREAM TOPICAL
Qty: 45 | Refills: 0 | Status: ON HOLD | COMMUNITY
Start: 2014-09-03

## 2021-10-08 RX ORDER — ZOLPIDEM TARTRATE 10 MG/1
TABLET, FILM COATED ORAL
Qty: 30 | Refills: 0 | Status: ON HOLD | COMMUNITY
Start: 2012-12-04

## 2021-10-08 RX ORDER — OSELTAMIVIR PHOSPHATE 6 MG/ML
POWDER, FOR SUSPENSION ORAL
Qty: 60 | Refills: 0 | Status: ON HOLD | COMMUNITY
Start: 2013-02-27

## 2021-10-08 RX ORDER — FLUCONAZOLE 150 MG/1
TABLET ORAL
Qty: 1 | Refills: 0 | Status: ON HOLD | COMMUNITY
Start: 2013-02-22

## 2021-10-08 RX ORDER — AZITHROMYCIN DIHYDRATE 250 MG/1
TABLET, FILM COATED ORAL
Qty: 6 | Refills: 0 | Status: ON HOLD | COMMUNITY
Start: 2014-01-03

## 2021-10-08 RX ORDER — FAMOTIDINE 40 MG/1
1 TABLET AT BEDTIME TABLET, FILM COATED ORAL ONCE A DAY
Qty: 90 TABLETS | Refills: 3 | Status: ACTIVE | COMMUNITY
Start: 2021-09-24

## 2021-10-08 RX ORDER — PROMETHAZINE HYDROCHLORIDE AND DEXTROMETHORPHAN HYDROBROMIDE 6.25; 15 MG/5ML; MG/5ML
TAKE 5 MLS (ONE TEASPOONFUL) BY MOUTH EVERY 6 HOURS FOR 7 DAYS SOLUTION ORAL
Qty: 140 | Refills: 0 | Status: ON HOLD | COMMUNITY
Start: 2018-12-12

## 2021-10-08 RX ORDER — HYDROCODONE BITARTRATE AND ACETAMINOPHEN 5; 325 MG/1; MG/1
TABLET ORAL
Qty: 15 | Refills: 0 | Status: ON HOLD | COMMUNITY
Start: 2014-04-16

## 2021-10-08 RX ORDER — CODEINE PHOSPHATE AND GUAIFENESIN 10; 100 MG/5ML; MG/5ML
TAKE 2 TEASPOONSFUL BY MOUTH AT BEDTIME AS NEEDED SOLUTION ORAL
Qty: 120 | Refills: 0 | Status: ON HOLD | COMMUNITY
Start: 2017-12-06

## 2021-10-08 RX ORDER — PANTOPRAZOLE SODIUM 40 MG/1
TAKE 1 TABLET BY MOUTH EVERY DAY TABLET, DELAYED RELEASE ORAL
Qty: 90 | Refills: 3 | Status: ACTIVE | COMMUNITY

## 2021-10-08 RX ORDER — HYDROCORTISONE 1 %
CREAM (GRAM) TOPICAL
Qty: 225 | Refills: 0 | Status: ON HOLD | COMMUNITY
Start: 2014-11-26

## 2021-10-08 RX ORDER — CEPHALEXIN 500 MG/1
CAPSULE ORAL
Qty: 28 | Refills: 0 | Status: ON HOLD | COMMUNITY
Start: 2014-04-16

## 2021-10-08 RX ORDER — FAMOTIDINE 40 MG/1
TAKE 1 TABLET BEDTIME TABLET ORAL
Qty: 90 | Refills: 3 | Status: ON HOLD | COMMUNITY
Start: 2019-11-12

## 2021-10-08 RX ORDER — LINACLOTIDE 290 UG/1
1 CAPSULE AT LEAST 30 MINUTES BEFORE THE FIRST MEAL OF THE DAY ON AN EMPTY STOMACH CAPSULE, GELATIN COATED ORAL ONCE A DAY
Qty: 90 | Refills: 3 | Status: ACTIVE | COMMUNITY
Start: 2021-09-24 | End: 2022-09-19

## 2021-10-08 RX ORDER — MUPIROCIN 20 MG/G
ONCE DAILY OINTMENT TOPICAL
Refills: 0 | Status: ON HOLD | COMMUNITY
Start: 2020-02-25

## 2021-10-08 RX ORDER — BUTALBITAL, ACETAMINOPHEN, AND CAFFEINE 50; 325; 40 MG/1; MG/1; MG/1
TABLET ORAL
Qty: 15 | Refills: 0 | Status: ON HOLD | COMMUNITY
Start: 2015-01-19

## 2021-10-08 RX ORDER — NEOMYCIN SULFATE, POLYMYXIN B SULFATE AND DEXAMETHASONE 3.5; 10000; 1 MG/ML; [USP'U]/ML; MG/ML
SUSPENSION/ DROPS OPHTHALMIC
Qty: 5 | Refills: 0 | Status: ON HOLD | COMMUNITY
Start: 2018-12-17

## 2021-10-08 RX ORDER — PROMETHAZINE HYDROCHLORIDE 12.5 MG/1
TAKE ONE TABLET BY MOUTH EVERY 12 HOURS AS NEEDED FOR NAUSEA TABLET ORAL
Qty: 45 | Refills: 3 | Status: ON HOLD | COMMUNITY

## 2021-10-08 RX ORDER — METHYLPREDNISOLONE 4 MG/1
TABLET ORAL
Qty: 21 | Refills: 0 | Status: ON HOLD | COMMUNITY
Start: 2017-11-06

## 2021-10-08 RX ORDER — PROMETHAZINE HYDROCHLORIDE 25 MG/1
TABLET ORAL
Qty: 10 | Refills: 0 | Status: ON HOLD | COMMUNITY
Start: 2017-12-21

## 2021-10-08 RX ORDER — DOXYCYCLINE 100 MG/1
CAPSULE ORAL
Qty: 20 | Refills: 0 | Status: ON HOLD | COMMUNITY
Start: 2013-02-21

## 2021-10-08 RX ORDER — CEFDINIR 300 MG/1
CAPSULE ORAL
Qty: 20 | Refills: 0 | Status: ON HOLD | COMMUNITY
Start: 2019-01-03

## 2021-10-08 RX ORDER — NAPROXEN 500 MG/1
TABLET ORAL
Qty: 30 | Refills: 0 | Status: ON HOLD | COMMUNITY
Start: 2016-12-26

## 2021-10-08 RX ORDER — TIZANIDINE 2 MG/1
TABLET ORAL
Qty: 30 | Refills: 0 | Status: ON HOLD | COMMUNITY
Start: 2019-05-17

## 2021-10-08 RX ORDER — AMLODIPINE BESYLATE 10 MG/1
TAKE 1 TABLET DAILY TABLET ORAL
Refills: 0 | Status: ACTIVE | COMMUNITY

## 2021-10-08 RX ORDER — LINACLOTIDE 290 UG/1
TAKE 1 CAPSULE EVERY MORNING 30 MINUTES BEFORE BREAKFAST CAPSULE, GELATIN COATED ORAL
Qty: 90 | Refills: 0 | Status: ACTIVE | COMMUNITY

## 2021-10-08 RX ORDER — ONDANSETRON 4 MG/1
TABLET, ORALLY DISINTEGRATING ORAL
Qty: 14 | Refills: 0 | Status: ON HOLD | COMMUNITY
Start: 2019-11-07

## 2021-10-08 RX ORDER — CINACALCET HYDROCHLORIDE 30 MG/1
TABLET, COATED ORAL
Qty: 20 | Refills: 0 | Status: ON HOLD | COMMUNITY
Start: 2017-12-07

## 2021-10-08 RX ORDER — CLINDAMYCIN HYDROCHLORIDE 300 MG/1
CAPSULE ORAL
Qty: 21 | Refills: 0 | Status: ON HOLD | COMMUNITY
Start: 2014-10-08

## 2021-10-08 RX ORDER — PANTOPRAZOLE SODIUM 40 MG/1
TAKE 1 TABLET DAILY TABLET, DELAYED RELEASE ORAL ONCE A DAY
Qty: 90 | Refills: 3 | Status: ACTIVE | COMMUNITY
Start: 2021-09-24

## 2021-10-08 RX ORDER — OMEPRAZOLE 20 MG/1
CAPSULE, DELAYED RELEASE ORAL
Qty: 30 | Refills: 0 | Status: ON HOLD | COMMUNITY
Start: 2017-12-19

## 2021-10-08 RX ORDER — MELOXICAM 7.5 MG/1
TABLET ORAL
Qty: 30 | Refills: 0 | Status: ON HOLD | COMMUNITY
Start: 2018-06-20

## 2021-10-08 RX ORDER — NYSTATIN 100000 [USP'U]/G
OINTMENT TOPICAL
Qty: 30 | Refills: 0 | Status: ON HOLD | COMMUNITY
Start: 2019-05-10

## 2021-10-08 RX ORDER — NORTRIPTYLINE HYDROCHLORIDE 10 MG/1
CAPSULE ORAL
Qty: 30 | Refills: 0 | Status: ON HOLD | COMMUNITY
Start: 2015-05-29

## 2021-10-08 RX ORDER — HYDROCODONE BITARTRATE AND ACETAMINOPHEN 7.5; 325 MG/1; MG/1
TABLET ORAL
Qty: 16 | Refills: 0 | Status: ON HOLD | COMMUNITY
Start: 2014-11-14

## 2021-10-08 RX ORDER — DEXTROSE 4 G
TABLET,CHEWABLE ORAL
Qty: 30 | Refills: 0 | Status: ON HOLD | COMMUNITY
Start: 2016-03-20

## 2021-11-09 ENCOUNTER — ERX REFILL RESPONSE (OUTPATIENT)
Dept: URBAN - METROPOLITAN AREA CLINIC 113 | Facility: CLINIC | Age: 46
End: 2021-11-09

## 2021-11-09 RX ORDER — PROMETHAZINE HYDROCHLORIDE 12.5 MG/1
TAKE ONE TABLET BY MOUTH EVERY 12 HOURS AS NEEDED FOR NAUSEA TABLET ORAL
Qty: 45 TABLET | Refills: 4 | OUTPATIENT

## 2021-11-09 RX ORDER — PROMETHAZINE HYDROCHLORIDE 12.5 MG/1
TAKE ONE TABLET BY MOUTH EVERY 12 HOURS AS NEEDED FOR NAUSEA TABLET ORAL
Qty: 45 | Refills: 3 | OUTPATIENT

## 2022-01-04 ENCOUNTER — OFFICE VISIT (OUTPATIENT)
Dept: URBAN - METROPOLITAN AREA CLINIC 113 | Facility: CLINIC | Age: 47
End: 2022-01-04

## 2022-03-24 ENCOUNTER — OFFICE VISIT (OUTPATIENT)
Dept: URBAN - METROPOLITAN AREA CLINIC 113 | Facility: CLINIC | Age: 47
End: 2022-03-24

## 2022-03-28 ENCOUNTER — OFFICE VISIT (OUTPATIENT)
Dept: URBAN - METROPOLITAN AREA CLINIC 113 | Facility: CLINIC | Age: 47
End: 2022-03-28

## 2022-04-06 ENCOUNTER — OFFICE VISIT (OUTPATIENT)
Dept: URBAN - METROPOLITAN AREA CLINIC 107 | Facility: CLINIC | Age: 47
End: 2022-04-06

## 2022-04-13 ENCOUNTER — OFFICE VISIT (OUTPATIENT)
Dept: URBAN - METROPOLITAN AREA CLINIC 107 | Facility: CLINIC | Age: 47
End: 2022-04-13

## 2022-04-20 ENCOUNTER — OFFICE VISIT (OUTPATIENT)
Dept: URBAN - METROPOLITAN AREA CLINIC 107 | Facility: CLINIC | Age: 47
End: 2022-04-20

## 2022-05-04 ENCOUNTER — OFFICE VISIT (OUTPATIENT)
Dept: URBAN - METROPOLITAN AREA CLINIC 107 | Facility: CLINIC | Age: 47
End: 2022-05-04

## 2022-05-06 ENCOUNTER — OFFICE VISIT (OUTPATIENT)
Dept: URBAN - METROPOLITAN AREA CLINIC 113 | Facility: CLINIC | Age: 47
End: 2022-05-06

## 2022-11-28 ENCOUNTER — ERX REFILL RESPONSE (OUTPATIENT)
Dept: URBAN - METROPOLITAN AREA CLINIC 113 | Facility: CLINIC | Age: 47
End: 2022-11-28

## 2022-11-28 RX ORDER — LINACLOTIDE 290 UG/1
TAKE 1 CAPSULE BY MOUTH AT LEAST 30 MINUTES BEFORE FIRST MEAL OF DAY ON AN EMPTY STOMACH CAPSULE, GELATIN COATED ORAL
Qty: 90 CAPSULE | Refills: 0 | OUTPATIENT

## 2022-11-28 RX ORDER — LINACLOTIDE 290 UG/1
TAKE 1 CAPSULE EVERY MORNING 30 MINUTES BEFORE BREAKFAST CAPSULE, GELATIN COATED ORAL
Qty: 90 | Refills: 0 | OUTPATIENT

## 2023-02-08 ENCOUNTER — OFFICE VISIT (OUTPATIENT)
Dept: URBAN - METROPOLITAN AREA CLINIC 107 | Facility: CLINIC | Age: 48
End: 2023-02-08
Payer: MEDICARE

## 2023-02-08 VITALS
HEIGHT: 68 IN | TEMPERATURE: 97.5 F | WEIGHT: 194 LBS | BODY MASS INDEX: 29.4 KG/M2 | SYSTOLIC BLOOD PRESSURE: 97 MMHG | DIASTOLIC BLOOD PRESSURE: 64 MMHG | HEART RATE: 87 BPM

## 2023-02-08 DIAGNOSIS — R19.7 DIARRHEA: ICD-10-CM

## 2023-02-08 PROCEDURE — 99214 OFFICE O/P EST MOD 30 MIN: CPT | Performed by: NURSE PRACTITIONER

## 2023-02-08 RX ORDER — SEVELAMER CARBONATE 800 MG/1
TABLET, FILM COATED ORAL
Qty: 450 | Refills: 0 | Status: ACTIVE | COMMUNITY
Start: 2016-01-18

## 2023-02-08 RX ORDER — METOPROLOL TARTRATE 25 MG/1
1 TABLET WITH FOOD TABLET, FILM COATED ORAL TWICE A DAY
Status: ACTIVE | COMMUNITY

## 2023-02-08 RX ORDER — FAMOTIDINE 40 MG/1
1 TABLET AT BEDTIME TABLET, FILM COATED ORAL ONCE A DAY
Qty: 90 TABLETS | Refills: 3 | Status: ACTIVE | COMMUNITY
Start: 2021-09-24

## 2023-02-08 RX ORDER — PANTOPRAZOLE SODIUM 40 MG/1
TAKE 1 TABLET DAILY TABLET, DELAYED RELEASE ORAL ONCE A DAY
Qty: 90 | Refills: 3 | Status: ACTIVE | COMMUNITY
Start: 2021-09-24

## 2023-02-08 RX ORDER — PROMETHAZINE HYDROCHLORIDE 12.5 MG/1
TAKE ONE TABLET BY MOUTH EVERY 12 HOURS AS NEEDED FOR NAUSEA TABLET ORAL
Qty: 45 TABLET | Refills: 4 | Status: ACTIVE | COMMUNITY

## 2023-02-08 RX ORDER — LINACLOTIDE 290 UG/1
TAKE 1 CAPSULE BY MOUTH AT LEAST 30 MINUTES BEFORE FIRST MEAL OF DAY ON AN EMPTY STOMACH CAPSULE, GELATIN COATED ORAL
Qty: 90 CAPSULE | Refills: 0 | Status: ON HOLD | COMMUNITY

## 2023-02-08 NOTE — HPI-OTHER HISTORIES
EGD normal on 6/5/19. The esophagus was empirically dilated to 48F. Echocardiogram on 5/22/19 showed LV EF 60%, mIld increase in left ventricular wall thickness, mild aortic regurgitation, mild tricuspid regurgitation, and trace mitral valve regurgitation. CT a/p without IV contrast on 5/10/19 showed a 2.3cm density mass within the right colon likely representing a lipoma, mild fatty infiltration throughout the colon walls, a punctate nonobstructing stone within the left kidney, numerous bilateral renal cysts within the kidneys with severe atrophy of the kidneys consistent with polycystic kidney disease, sclerosis of the bones likely due to renal osteodystrophy, and cardiomegaly. Labs 5/5/19: H/H 9.3/28.4, MCV 95.9, Plt 265, WBC 4.5. CMP unremarkable aside from BUN/Creat 24/8.11 and glucose 155. Amylase 121, lipase 470. Iron 131, TIBC 197. Ferritin 1851. . Haptoglobin 6. Normal hemoglobin electrophoresis. UGI with KUB was performed on 4/24/19 revealing mild esophageal narrowing at the level of C4-C6 and mild gastroesophageal reflux to the level of the thoracic inlet. Colonoscopy 12/5/18: BBPS score equals 8; large 30 mm lipoma at the hepatic flexure. Mild internal hemorrhoids. Minimal pancolonic diverticulosis. Colonoscopy otherwise normal to the cecum. CT of the abdomen and pelvis without contrast on 6/13/18 revealed significant cardiomegaly, question of hemochromatosis with tense ventricular walls, incidental focal calcification of papillary muscle, barely detectable 25 cc of pericardial effusion less than prior, possible hemochromatosis with liver measuring 80 Hounsfield units and walls of the heart relatively clearly defined as denser than the chambers as additional evidence (target organ), hepatosplenomegaly, atrophic polycystic kidneys, cholecystectomy, and hysterectomy. No bowel abnormalities. EGD 1/10/18 : Mild benign-appearing intrinsic stenosis at 39 cm status post dilatation to 48 Nauruan, granular mucosa at the GE junction, normal stomach and normal examined duodenum. GE junction biopsies showed Worthy's mucosa without dysplasia. Barium swallow 11/27/17 : some dysmotility of esophagus with some mild tertiary contractions and diminished peristalsis. Mildly dilated thoracic esophagus. Mild narrowing at the esophagogastric junction. Barium pill displayed mild stasis at GEJ but then passed rapidly into stomach. Capsule endoscopy 6/13/16 : normal. SB series 4/27/16 : No dilated bowel, mucosal thickening or obstruction. Transit time into the colon was 1 hour. Unremarkable study

## 2023-02-08 NOTE — HPI-TODAY'S VISIT:
Ms. Mixon is a 48-year-old female with a history of myelodysplastic syndrome, anemia on Epogen (Dr. Cervantes), ESRD on hemodialysis, GERD, Worthy's esophagus, gastroparesis, and chronic constipation, presenting for evaluation of diarrhea. She was seen in the office in September 2021 for evaluation of recurrent dysphagia, previously responsive to empiric esophageal dilation.  Repeat EGD with dilation was planned.  Regarding GERD, she was to continue pantoprazole with the addition of nightly famotidine.  Her chronic constipation was managed with Linzess 290 mcg daily. EGD 10/8/2021:Mild GEJ narrowing status post 48F Savary dilation, mild erosive antral gastritis.  Antral biopsy showed mild chronic inactive gastritis, negative for H. pylori. She had a toe amputation last year and was placed on IV antibiotics x1 month through a PICC line. She just completed treatment near the end of January. Since being on the antibiotics, she has experienced ongoing difficulty with diarrea. She is having 5-6 loose, urgent stools per day with associated gas and nighttime bowel movements. She denies abdominal pain. She did experience an isolated episode of red blood per rectum with wiping last week. She started Align probiotic without change. She reports a negative C. difficile stool test last week with infectious disease at [facility]. She has lost about 10lb since onset.

## 2023-02-09 PROBLEM — 62315008 DIARRHEA: Status: ACTIVE | Noted: 2023-02-09

## 2023-03-21 ENCOUNTER — OFFICE VISIT (OUTPATIENT)
Dept: URBAN - METROPOLITAN AREA MEDICAL CENTER 19 | Facility: MEDICAL CENTER | Age: 48
End: 2023-03-21

## 2023-03-21 ENCOUNTER — TELEPHONE ENCOUNTER (OUTPATIENT)
Dept: URBAN - METROPOLITAN AREA CLINIC 35 | Facility: CLINIC | Age: 48
End: 2023-03-21

## 2023-03-21 PROCEDURE — 992 APS NON BILLABLE: Performed by: INTERNAL MEDICINE

## 2023-03-21 RX ORDER — PROMETHAZINE HYDROCHLORIDE 12.5 MG/1
TAKE ONE TABLET BY MOUTH EVERY 12 HOURS AS NEEDED FOR NAUSEA TABLET ORAL
Qty: 45 TABLET | Refills: 4 | Status: ACTIVE | COMMUNITY

## 2023-03-21 RX ORDER — METOPROLOL TARTRATE 25 MG/1
1 TABLET WITH FOOD TABLET, FILM COATED ORAL TWICE A DAY
Status: ACTIVE | COMMUNITY

## 2023-03-21 RX ORDER — PANTOPRAZOLE SODIUM 40 MG/1
TAKE 1 TABLET DAILY TABLET, DELAYED RELEASE ORAL ONCE A DAY
Qty: 90 | Refills: 3 | Status: ACTIVE | COMMUNITY
Start: 2021-09-24

## 2023-03-21 RX ORDER — LINACLOTIDE 290 UG/1
TAKE 1 CAPSULE BY MOUTH AT LEAST 30 MINUTES BEFORE FIRST MEAL OF DAY ON AN EMPTY STOMACH CAPSULE, GELATIN COATED ORAL
Qty: 90 CAPSULE | Refills: 0 | Status: ON HOLD | COMMUNITY

## 2023-03-21 RX ORDER — SEVELAMER CARBONATE 800 MG/1
TABLET, FILM COATED ORAL
Qty: 450 | Refills: 0 | Status: ACTIVE | COMMUNITY
Start: 2016-01-18

## 2023-03-21 RX ORDER — FAMOTIDINE 40 MG/1
1 TABLET AT BEDTIME TABLET, FILM COATED ORAL ONCE A DAY
Qty: 90 TABLETS | Refills: 3 | Status: ACTIVE | COMMUNITY
Start: 2021-09-24

## 2023-04-29 ENCOUNTER — ERX REFILL RESPONSE (OUTPATIENT)
Dept: URBAN - METROPOLITAN AREA CLINIC 113 | Facility: CLINIC | Age: 48
End: 2023-04-29

## 2023-04-29 RX ORDER — LINACLOTIDE 290 UG/1
TAKE 1 CAPSULE BY MOUTH AT LEAST 30 MINUTES BEFORE FIRST MEAL OF DAY ON AN EMPTY STOMACH 90 CAPSULE, GELATIN COATED ORAL
Qty: 90 CAPSULE | Refills: 3 | OUTPATIENT

## 2023-04-29 RX ORDER — LINACLOTIDE 290 UG/1
TAKE 1 CAPSULE BY MOUTH AT LEAST 30 MINUTES BEFORE FIRST MEAL OF DAY ON AN EMPTY STOMACH CAPSULE, GELATIN COATED ORAL
Qty: 90 CAPSULE | Refills: 0 | OUTPATIENT

## 2023-05-03 ENCOUNTER — WEB ENCOUNTER (OUTPATIENT)
Dept: URBAN - METROPOLITAN AREA CLINIC 107 | Facility: CLINIC | Age: 48
End: 2023-05-03

## 2023-05-03 ENCOUNTER — OFFICE VISIT (OUTPATIENT)
Dept: URBAN - METROPOLITAN AREA CLINIC 107 | Facility: CLINIC | Age: 48
End: 2023-05-03
Payer: MEDICARE

## 2023-05-03 VITALS
HEART RATE: 80 BPM | WEIGHT: 194.6 LBS | HEIGHT: 68 IN | TEMPERATURE: 98 F | BODY MASS INDEX: 29.49 KG/M2 | DIASTOLIC BLOOD PRESSURE: 54 MMHG | RESPIRATION RATE: 18 BRPM | SYSTOLIC BLOOD PRESSURE: 85 MMHG

## 2023-05-03 DIAGNOSIS — R19.7 DIARRHEA: ICD-10-CM

## 2023-05-03 DIAGNOSIS — Z86.010 HISTORY OF COLON POLYPS: ICD-10-CM

## 2023-05-03 PROCEDURE — 99213 OFFICE O/P EST LOW 20 MIN: CPT | Performed by: NURSE PRACTITIONER

## 2023-05-03 RX ORDER — LINACLOTIDE 290 UG/1
TAKE 1 CAPSULE BY MOUTH AT LEAST 30 MINUTES BEFORE FIRST MEAL OF DAY ON AN EMPTY STOMACH 90 CAPSULE, GELATIN COATED ORAL
Qty: 90 CAPSULE | Refills: 3 | Status: ACTIVE | COMMUNITY

## 2023-05-03 RX ORDER — SEVELAMER CARBONATE 800 MG/1
TABLET, FILM COATED ORAL
Qty: 450 | Refills: 0 | Status: ACTIVE | COMMUNITY
Start: 2016-01-18

## 2023-05-03 RX ORDER — PANTOPRAZOLE SODIUM 40 MG/1
TAKE 1 TABLET DAILY TABLET, DELAYED RELEASE ORAL ONCE A DAY
Qty: 90 | Refills: 3 | Status: ACTIVE | COMMUNITY
Start: 2021-09-24

## 2023-05-03 RX ORDER — PROMETHAZINE HYDROCHLORIDE 12.5 MG/1
TAKE ONE TABLET BY MOUTH EVERY 12 HOURS AS NEEDED FOR NAUSEA TABLET ORAL
Qty: 45 TABLET | Refills: 4 | Status: ACTIVE | COMMUNITY

## 2023-05-03 RX ORDER — METOPROLOL TARTRATE 25 MG/1
1 TABLET WITH FOOD TABLET, FILM COATED ORAL TWICE A DAY
Status: ON HOLD | COMMUNITY

## 2023-05-03 RX ORDER — FAMOTIDINE 40 MG/1
1 TABLET AT BEDTIME TABLET, FILM COATED ORAL ONCE A DAY
Qty: 90 TABLETS | Refills: 3 | Status: ACTIVE | COMMUNITY
Start: 2021-09-24

## 2023-05-03 RX ORDER — RIVAROXABAN 15 MG/1
1 TABLET WITH FOOD TABLET, FILM COATED ORAL ONCE A DAY
Status: ACTIVE | COMMUNITY

## 2023-05-03 NOTE — HPI-TODAY'S VISIT:
Ms. Mixon is a 48-year-old female with a history of myelodysplastic syndrome, anemia on Epogen (Dr. Cervantes), ESRD on hemodialysis, GERD, Worthy's esophagus, gastroparesis, and chronic constipation, presenting for follow-up of diarrhea. She was seen in the office in February for evaluation of diarrhea suspected to be antibiotic-associated. She reported a negative C. dif toxin. A diagnostic colonoscopy with random biopsies was planned to exclude microscopic colitis.  She ultimately cancelled the procedure as the diarrhea resolved upon discontinuation of her long-term antibiotics. Her bowel habits are regular without blood per rectum. No abdominal pain, nausea or vomiting. Her blood pressure is low in the office today, however, she completed dialysis just prior to this visit. She denies chest pain, dyspnea, headache, blurred vision or dizziness.

## 2023-05-03 NOTE — HPI-OTHER HISTORIES
EGD 10/8/2021:Mild GEJ narrowing status post 48F Savary dilation, mild erosive antral gastritis.  Antral biopsy showed mild chronic inactive gastritis, negative for H. pylori. EGD normal on 6/5/19. The esophagus was empirically dilated to 48F. Echocardiogram on 5/22/19 showed LV EF 60%, mIld increase in left ventricular wall thickness, mild aortic regurgitation, mild tricuspid regurgitation, and trace mitral valve regurgitation. CT a/p without IV contrast on 5/10/19 showed a 2.3cm density mass within the right colon likely representing a lipoma, mild fatty infiltration throughout the colon walls, a punctate nonobstructing stone within the left kidney, numerous bilateral renal cysts within the kidneys with severe atrophy of the kidneys consistent with polycystic kidney disease, sclerosis of the bones likely due to renal osteodystrophy, and cardiomegaly. Labs 5/5/19: H/H 9.3/28.4, MCV 95.9, Plt 265, WBC 4.5. CMP unremarkable aside from BUN/Creat 24/8.11 and glucose 155. Amylase 121, lipase 470. Iron 131, TIBC 197. Ferritin 1851. . Haptoglobin 6. Normal hemoglobin electrophoresis. UGI with KUB was performed on 4/24/19 revealing mild esophageal narrowing at the level of C4-C6 and mild gastroesophageal reflux to the level of the thoracic inlet. Colonoscopy 12/5/18: BBPS score equals 8; large 30 mm lipoma at the hepatic flexure. Mild internal hemorrhoids. Minimal pancolonic diverticulosis. Colonoscopy otherwise normal to the cecum. CT of the abdomen and pelvis without contrast on 6/13/18 revealed significant cardiomegaly, question of hemochromatosis with tense ventricular walls, incidental focal calcification of papillary muscle, barely detectable 25 cc of pericardial effusion less than prior, possible hemochromatosis with liver measuring 80 Hounsfield units and walls of the heart relatively clearly defined as denser than the chambers as additional evidence (target organ), hepatosplenomegaly, atrophic polycystic kidneys, cholecystectomy, and hysterectomy. No bowel abnormalities. EGD 1/10/18 : Mild benign-appearing intrinsic stenosis at 39 cm status post dilatation to 48 German, granular mucosa at the GE junction, normal stomach and normal examined duodenum. GE junction biopsies showed Worthy's mucosa without dysplasia. Barium swallow 11/27/17 : some dysmotility of esophagus with some mild tertiary contractions and diminished peristalsis. Mildly dilated thoracic esophagus. Mild narrowing at the esophagogastric junction. Barium pill displayed mild stasis at GEJ but then passed rapidly into stomach. Capsule endoscopy 6/13/16 : normal. SB series 4/27/16 : No dilated bowel, mucosal thickening or obstruction. Transit time into the colon was 1 hour. Unremarkable study Primary Defect Length In Cm (Final Defect Size - Required For Flaps/Grafts): 2.7

## 2023-11-15 ENCOUNTER — OFFICE VISIT (OUTPATIENT)
Dept: URBAN - METROPOLITAN AREA CLINIC 107 | Facility: CLINIC | Age: 48
End: 2023-11-15
Payer: MEDICARE

## 2023-11-15 VITALS
DIASTOLIC BLOOD PRESSURE: 68 MMHG | HEART RATE: 108 BPM | BODY MASS INDEX: 29.89 KG/M2 | HEIGHT: 68 IN | WEIGHT: 197.2 LBS | SYSTOLIC BLOOD PRESSURE: 87 MMHG | RESPIRATION RATE: 18 BRPM | TEMPERATURE: 96.4 F

## 2023-11-15 DIAGNOSIS — Z99.2 DEPENDENCE ON RENAL DIALYSIS: ICD-10-CM

## 2023-11-15 DIAGNOSIS — Z79.01 ANTICOAGULANT LONG-TERM USE: ICD-10-CM

## 2023-11-15 DIAGNOSIS — L02.211 ABDOMINAL WALL ABSCESS: ICD-10-CM

## 2023-11-15 DIAGNOSIS — N18.6 END STAGE RENAL DISEASE: ICD-10-CM

## 2023-11-15 DIAGNOSIS — Z86.010 HISTORY OF ADENOMATOUS POLYP OF COLON: ICD-10-CM

## 2023-11-15 PROCEDURE — 99214 OFFICE O/P EST MOD 30 MIN: CPT | Performed by: NURSE PRACTITIONER

## 2023-11-15 RX ORDER — PANTOPRAZOLE SODIUM 40 MG/1
TAKE 1 TABLET DAILY TABLET, DELAYED RELEASE ORAL ONCE A DAY
Qty: 90 | Refills: 3 | Status: ACTIVE | COMMUNITY
Start: 2021-09-24

## 2023-11-15 RX ORDER — LINACLOTIDE 290 UG/1
TAKE 1 CAPSULE BY MOUTH AT LEAST 30 MINUTES BEFORE FIRST MEAL OF DAY ON AN EMPTY STOMACH 90 CAPSULE, GELATIN COATED ORAL
Qty: 90 CAPSULE | Refills: 3 | Status: ACTIVE | COMMUNITY

## 2023-11-15 RX ORDER — FAMOTIDINE 40 MG/1
1 TABLET AT BEDTIME TABLET, FILM COATED ORAL ONCE A DAY
Qty: 90 TABLETS | Refills: 3 | Status: ACTIVE | COMMUNITY
Start: 2021-09-24

## 2023-11-15 RX ORDER — METOPROLOL TARTRATE 25 MG/1
1 TABLET WITH FOOD TABLET, FILM COATED ORAL TWICE A DAY
Status: ON HOLD | COMMUNITY

## 2023-11-15 RX ORDER — RIVAROXABAN 15 MG/1
1 TABLET WITH FOOD TABLET, FILM COATED ORAL ONCE A DAY
Status: ACTIVE | COMMUNITY

## 2023-11-15 RX ORDER — PROMETHAZINE HYDROCHLORIDE 12.5 MG/1
TAKE ONE TABLET BY MOUTH EVERY 12 HOURS AS NEEDED FOR NAUSEA TABLET ORAL
Qty: 45 TABLET | Refills: 4 | Status: ACTIVE | COMMUNITY

## 2023-11-15 RX ORDER — SEVELAMER CARBONATE 800 MG/1
TABLET, FILM COATED ORAL
Qty: 450 | Refills: 0 | Status: ACTIVE | COMMUNITY
Start: 2016-01-18

## 2023-11-15 NOTE — HPI-TODAY'S VISIT:
Ms. Mixon is a 48-year-old female with a history of myelodysplastic syndrome, anemia on Epogen (Dr. Cervantes), ESRD on hemodialysis, GERD, Worthy's esophagus, gastroparesis, and chronic constipation, presenting for follow-up. She was seen in the office in May for follow-up of diarrhea which had resolved with discontinuation of long-term IV antibiotics.  She was recommended a colonoscopy for polyp surveillance in December 2023. Her interval history is notable for hospitalization for abdominal wall abscess status post exploratory surgical intervention with excision of abdominal wound and wound VAC placement (10/25), which remains in place.  She had 3 fingers amputated in July due to ischemia with gangrene. Overall, she is doing well from a GI standpoint.  She continues to have postsurgical abdominal tenderness which is being managed by surgery.  No nausea or vomiting.  Her bowel habits are regular.  She has not experienced further diarrhea.  No blood in the stool.  She continues to experience episodes of hypotension following dialysis, which was treated while inpatient. She is followed by Dr. Polk.

## 2023-11-15 NOTE — HPI-OTHER HISTORIES
EGD 10/8/2021:Mild GEJ narrowing status post 48F Savary dilation, mild erosive antral gastritis.  Antral biopsy showed mild chronic inactive gastritis, negative for H. pylori. EGD normal on 6/5/19. The esophagus was empirically dilated to 48F. Echocardiogram on 5/22/19 showed LV EF 60%, mIld increase in left ventricular wall thickness, mild aortic regurgitation, mild tricuspid regurgitation, and trace mitral valve regurgitation. CT a/p without IV contrast on 5/10/19 showed a 2.3cm density mass within the right colon likely representing a lipoma, mild fatty infiltration throughout the colon walls, a punctate nonobstructing stone within the left kidney, numerous bilateral renal cysts within the kidneys with severe atrophy of the kidneys consistent with polycystic kidney disease, sclerosis of the bones likely due to renal osteodystrophy, and cardiomegaly. Labs 5/5/19: H/H 9.3/28.4, MCV 95.9, Plt 265, WBC 4.5. CMP unremarkable aside from BUN/Creat 24/8.11 and glucose 155. Amylase 121, lipase 470. Iron 131, TIBC 197. Ferritin 1851. . Haptoglobin 6. Normal hemoglobin electrophoresis. UGI with KUB was performed on 4/24/19 revealing mild esophageal narrowing at the level of C4-C6 and mild gastroesophageal reflux to the level of the thoracic inlet. Colonoscopy 12/5/18: BBPS score equals 8; large 30 mm lipoma at the hepatic flexure. Mild internal hemorrhoids. Minimal pancolonic diverticulosis. Colonoscopy otherwise normal to the cecum. CT of the abdomen and pelvis without contrast on 6/13/18 revealed significant cardiomegaly, question of hemochromatosis with tense ventricular walls, incidental focal calcification of papillary muscle, barely detectable 25 cc of pericardial effusion less than prior, possible hemochromatosis with liver measuring 80 Hounsfield units and walls of the heart relatively clearly defined as denser than the chambers as additional evidence (target organ), hepatosplenomegaly, atrophic polycystic kidneys, cholecystectomy, and hysterectomy. No bowel abnormalities. EGD 1/10/18 : Mild benign-appearing intrinsic stenosis at 39 cm status post dilatation to 48 Pashto, granular mucosa at the GE junction, normal stomach and normal examined duodenum. GE junction biopsies showed Worthy's mucosa without dysplasia. Barium swallow 11/27/17 : some dysmotility of esophagus with some mild tertiary contractions and diminished peristalsis. Mildly dilated thoracic esophagus. Mild narrowing at the esophagogastric junction. Barium pill displayed mild stasis at GEJ but then passed rapidly into stomach. Capsule endoscopy 6/13/16 : normal. SB series 4/27/16 : No dilated bowel, mucosal thickening or obstruction. Transit time into the colon was 1 hour. Unremarkable study

## 2023-11-19 PROBLEM — 236435004: Status: ACTIVE | Noted: 2023-11-19

## 2023-11-19 PROBLEM — 105502003: Status: ACTIVE | Noted: 2023-11-19

## 2024-01-08 ENCOUNTER — OFFICE VISIT (OUTPATIENT)
Dept: URBAN - METROPOLITAN AREA CLINIC 113 | Facility: CLINIC | Age: 49
End: 2024-01-08
Payer: MEDICARE

## 2024-01-08 VITALS
TEMPERATURE: 97.3 F | WEIGHT: 193 LBS | HEART RATE: 73 BPM | BODY MASS INDEX: 29.25 KG/M2 | HEIGHT: 68 IN | SYSTOLIC BLOOD PRESSURE: 114 MMHG | DIASTOLIC BLOOD PRESSURE: 80 MMHG

## 2024-01-08 DIAGNOSIS — R19.7 DIARRHEA: ICD-10-CM

## 2024-01-08 PROCEDURE — 99214 OFFICE O/P EST MOD 30 MIN: CPT | Performed by: NURSE PRACTITIONER

## 2024-01-08 RX ORDER — SEVELAMER CARBONATE 800 MG/1
TABLET, FILM COATED ORAL
Qty: 450 | Refills: 0 | Status: ON HOLD | COMMUNITY
Start: 2016-01-18

## 2024-01-08 RX ORDER — CYCLOBENZAPRINE HYDROCHLORIDE 5 MG/1
1 TABLET AT BEDTIME AS NEEDED TABLET, FILM COATED ORAL ONCE A DAY
Status: ACTIVE | COMMUNITY

## 2024-01-08 RX ORDER — PROMETHAZINE HYDROCHLORIDE 12.5 MG/1
TAKE ONE TABLET BY MOUTH EVERY 12 HOURS AS NEEDED FOR NAUSEA TABLET ORAL
Qty: 45 TABLET | Refills: 4 | Status: ACTIVE | COMMUNITY

## 2024-01-08 RX ORDER — RIVAROXABAN 15 MG/1
1 TABLET WITH FOOD TABLET, FILM COATED ORAL ONCE A DAY
Status: ACTIVE | COMMUNITY

## 2024-01-08 RX ORDER — PANTOPRAZOLE SODIUM 40 MG/1
TAKE 1 TABLET DAILY TABLET, DELAYED RELEASE ORAL ONCE A DAY
Qty: 90 | Refills: 3 | Status: ACTIVE | COMMUNITY
Start: 2021-09-24

## 2024-01-08 RX ORDER — METOPROLOL TARTRATE 25 MG/1
1 TABLET WITH FOOD TABLET, FILM COATED ORAL TWICE A DAY
Status: ON HOLD | COMMUNITY

## 2024-01-08 RX ORDER — LINACLOTIDE 290 UG/1
TAKE 1 CAPSULE BY MOUTH AT LEAST 30 MINUTES BEFORE FIRST MEAL OF DAY ON AN EMPTY STOMACH 90 CAPSULE, GELATIN COATED ORAL
Qty: 90 CAPSULE | Refills: 3 | Status: ON HOLD | COMMUNITY

## 2024-01-08 RX ORDER — FAMOTIDINE 40 MG/1
1 TABLET AT BEDTIME TABLET, FILM COATED ORAL ONCE A DAY
Qty: 90 TABLETS | Refills: 3 | Status: ACTIVE | COMMUNITY
Start: 2021-09-24

## 2024-01-08 RX ORDER — GABAPENTIN 300 MG/1
1 CAPSULE CAPSULE ORAL ONCE A DAY
Status: ACTIVE | COMMUNITY

## 2024-01-08 NOTE — HPI-TODAY'S VISIT:
Ms. Mixon is a 48-year-old female with a history of myelodysplastic syndrome, anemia on Epogen (Dr. Cervantes), ESRD on hemodialysis, GERD, Worthy's esophagus, gastroparesis, colon polyps, and chronic constipation, presenting for follow-up.  She was seen in the office in November to discuss colonoscopy for polyp surveillance. Scheduling was deferred at that time due to recent hospitalization for abdominal wall abscess requiring surgical excision and wound VAC placement which remained in place. The colonoscopy was to be scheduled at the hospital due to high risk for procedures and sedation, once fully recovered from recent abdominal wound infection.  She was hospitalized again mid-December for  sepsis secondary to her abdominal wound/abscess, treated with oral and IV antibiotics. Since this time, she has experienced ongoing difficulty with diarrhea. She is having 4-5 loose, slimy, yellow stools following any oral intake. She is having to wear diapers due to urgency and incontinence. Imodium provides some relief but she has to take double the dose. She denies any abdominal pain. No blood in the stool. She is going on a cruise in early February for her birthday.

## 2024-01-08 NOTE — HPI-OTHER HISTORIES
October 2023: hospitalization for abdominal wall abscess status post exploratory surgical intervention with excision of abdominal wound and wound VAC placement.  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 EGD 10/8/2021:Mild GEJ narrowing status post 48F Savary dilation, mild erosive antral gastritis.  Antral biopsy showed mild chronic inactive gastritis, negative for H. pylori. EGD normal on 6/5/19. The esophagus was empirically dilated to 48F. Echocardiogram on 5/22/19 showed LV EF 60%, mIld increase in left ventricular wall thickness, mild aortic regurgitation, mild tricuspid regurgitation, and trace mitral valve regurgitation. CT a/p without IV contrast on 5/10/19 showed a 2.3cm density mass within the right colon likely representing a lipoma, mild fatty infiltration throughout the colon walls, a punctate nonobstructing stone within the left kidney, numerous bilateral renal cysts within the kidneys with severe atrophy of the kidneys consistent with polycystic kidney disease, sclerosis of the bones likely due to renal osteodystrophy, and cardiomegaly. Labs 5/5/19: H/H 9.3/28.4, MCV 95.9, Plt 265, WBC 4.5. CMP unremarkable aside from BUN/Creat 24/8.11 and glucose 155. Amylase 121, lipase 470. Iron 131, TIBC 197. Ferritin 1851. . Haptoglobin 6. Normal hemoglobin electrophoresis. UGI with KUB was performed on 4/24/19 revealing mild esophageal narrowing at the level of C4-C6 and mild gastroesophageal reflux to the level of the thoracic inlet. Colonoscopy 12/5/18: BBPS score equals 8; large 30 mm lipoma at the hepatic flexure. Mild internal hemorrhoids. Minimal pancolonic diverticulosis. Colonoscopy otherwise normal to the cecum. CT of the abdomen and pelvis without contrast on 6/13/18 revealed significant cardiomegaly, question of hemochromatosis with tense ventricular walls, incidental focal calcification of papillary muscle, barely detectable 25 cc of pericardial effusion less than prior, possible hemochromatosis with liver measuring 80 Hounsfield units and walls of the heart relatively clearly defined as denser than the chambers as additional evidence (target organ), hepatosplenomegaly, atrophic polycystic kidneys, cholecystectomy, and hysterectomy. No bowel abnormalities. EGD 1/10/18 : Mild benign-appearing intrinsic stenosis at 39 cm status post dilatation to 48 Kiswahili, granular mucosa at the GE junction, normal stomach and normal examined duodenum. GE junction biopsies showed Worthy's mucosa without dysplasia. Barium swallow 11/27/17 : some dysmotility of esophagus with some mild tertiary contractions and diminished peristalsis. Mildly dilated thoracic esophagus. Mild narrowing at the esophagogastric junction. Barium pill displayed mild stasis at GEJ but then passed rapidly into stomach. Capsule endoscopy 6/13/16 : normal. SB series 4/27/16 : No dilated bowel, mucosal thickening or obstruction. Transit time into the colon was 1 hour. Unremarkable study

## 2024-01-26 LAB
CALPROTECTIN, FECAL: <5
CAMPYLOBACTER GROUP: (no result)
CLOSTRIDIUM DIFFICILE TOXINB,QL REAL TIME PCR: NOT DETECTED
CLOSTRIDIUM DIFFICILE: (no result)

## 2024-02-21 ENCOUNTER — OV EP (OUTPATIENT)
Dept: URBAN - METROPOLITAN AREA CLINIC 107 | Facility: CLINIC | Age: 49
End: 2024-02-21
Payer: MEDICARE

## 2024-02-21 ENCOUNTER — LAB (OUTPATIENT)
Dept: URBAN - METROPOLITAN AREA CLINIC 107 | Facility: CLINIC | Age: 49
End: 2024-02-21

## 2024-02-21 VITALS
DIASTOLIC BLOOD PRESSURE: 53 MMHG | TEMPERATURE: 97.3 F | SYSTOLIC BLOOD PRESSURE: 84 MMHG | HEART RATE: 41 BPM | HEIGHT: 68 IN | RESPIRATION RATE: 16 BRPM

## 2024-02-21 DIAGNOSIS — R19.7 DIARRHEA: ICD-10-CM

## 2024-02-21 DIAGNOSIS — Z86.010 HISTORY OF ADENOMATOUS POLYP OF COLON: ICD-10-CM

## 2024-02-21 PROCEDURE — 99214 OFFICE O/P EST MOD 30 MIN: CPT | Performed by: NURSE PRACTITIONER

## 2024-02-21 RX ORDER — GABAPENTIN 300 MG/1
1 CAPSULE CAPSULE ORAL ONCE A DAY
Status: ACTIVE | COMMUNITY

## 2024-02-21 RX ORDER — POLYETHYLENE GLYCOL 3350, SODIUM CHLORIDE, SODIUM BICARBONATE, POTASSIUM CHLORIDE 420; 11.2; 5.72; 1.48 G/4L; G/4L; G/4L; G/4L
AS DIRECTED POWDER, FOR SOLUTION ORAL
Qty: 420 GRAM | Refills: 0 | OUTPATIENT
Start: 2024-02-21 | End: 2024-02-22

## 2024-02-21 RX ORDER — COLESEVELAM HYDROCHLORIDE 625 MG/1
2 TABLETS EACH MORNING AND 1 TABLET IN THE EVENING TABLET, FILM COATED ORAL TWICE A DAY
Qty: 90 TABLETS | Refills: 3 | OUTPATIENT
Start: 2024-02-21

## 2024-02-21 RX ORDER — FAMOTIDINE 40 MG/1
1 TABLET AT BEDTIME TABLET, FILM COATED ORAL ONCE A DAY
Qty: 90 TABLETS | Refills: 3 | Status: ACTIVE | COMMUNITY
Start: 2021-09-24

## 2024-02-21 RX ORDER — METOPROLOL TARTRATE 25 MG/1
1 TABLET WITH FOOD TABLET, FILM COATED ORAL TWICE A DAY
Status: ON HOLD | COMMUNITY

## 2024-02-21 RX ORDER — SEVELAMER CARBONATE 800 MG/1
TABLET, FILM COATED ORAL
Qty: 450 | Refills: 0 | Status: ON HOLD | COMMUNITY
Start: 2016-01-18

## 2024-02-21 RX ORDER — PROMETHAZINE HYDROCHLORIDE 12.5 MG/1
TAKE ONE TABLET BY MOUTH EVERY 12 HOURS AS NEEDED FOR NAUSEA TABLET ORAL
Qty: 45 TABLET | Refills: 4 | Status: ACTIVE | COMMUNITY

## 2024-02-21 RX ORDER — RIVAROXABAN 15 MG/1
1 TABLET WITH FOOD TABLET, FILM COATED ORAL ONCE A DAY
Status: ACTIVE | COMMUNITY

## 2024-02-21 RX ORDER — PANTOPRAZOLE SODIUM 40 MG/1
TAKE 1 TABLET DAILY TABLET, DELAYED RELEASE ORAL ONCE A DAY
Qty: 90 | Refills: 3 | Status: ACTIVE | COMMUNITY
Start: 2021-09-24

## 2024-02-21 RX ORDER — LINACLOTIDE 290 UG/1
TAKE 1 CAPSULE BY MOUTH AT LEAST 30 MINUTES BEFORE FIRST MEAL OF DAY ON AN EMPTY STOMACH 90 CAPSULE, GELATIN COATED ORAL
Qty: 90 CAPSULE | Refills: 3 | Status: ON HOLD | COMMUNITY

## 2024-02-21 RX ORDER — CYCLOBENZAPRINE HYDROCHLORIDE 5 MG/1
1 TABLET AT BEDTIME AS NEEDED TABLET, FILM COATED ORAL ONCE A DAY
Status: ACTIVE | COMMUNITY

## 2024-02-21 NOTE — HPI-TODAY'S VISIT:
Ms. Mixon is a 49-year-old female with a history of myelodysplastic syndrome, anemia on Epogen (Dr. Cervantes), ESRD on hemodialysis, GERD, Worthy's esophagus, gastroparesis, colon polyps, and chronic constipation, presenting for follow-up regarding diarrhea. She was seen in the office on January 8 for evaluation of a recent exacerbation of diarrhea in the setting of multiple rounds of IV and oral antibiotics.  Stool studies were planned to exclude an infectious or inflammatory process.  Colonoscopy for polyp surveillance was deferred given recent hospitalization for sepsis and ongoing difficulty with wound healing.  She was to continue Imodium sparingly as needed in the absence of any rectal bleeding. Stool studies 1/19/2024:Negative C. difficile toxin.  GI pathogen panel not performed.  Normal fecal calprotectin. Her symptoms are unchanged. She continues to have 6-7 loose, urgent stools per day resulting in incontinence. She has to take 4 Imodium tablets for any relief. On occasion, she is awakening during the night for a bowel movement. She denies any blood in the stool. No abdominal pain. She has experienced two recent episodes of nighttime nausea without vomiting. Promethazine is helpful but she tries to avoid taking it due to its sedative effect. Ondansetron is not helpful. She denies any reflux on pantoprazole and famotidine.

## 2024-02-21 NOTE — HPI-OTHER HISTORIES
October 2023: hospitalization for abdominal wall abscess status post exploratory surgical intervention with excision of abdominal wound and wound VAC placement.  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 EGD 10/8/2021:Mild GEJ narrowing status post 48F Savary dilation, mild erosive antral gastritis.  Antral biopsy showed mild chronic inactive gastritis, negative for H. pylori. EGD normal on 6/5/19. The esophagus was empirically dilated to 48F. Echocardiogram on 5/22/19 showed LV EF 60%, mIld increase in left ventricular wall thickness, mild aortic regurgitation, mild tricuspid regurgitation, and trace mitral valve regurgitation. CT a/p without IV contrast on 5/10/19 showed a 2.3cm density mass within the right colon likely representing a lipoma, mild fatty infiltration throughout the colon walls, a punctate nonobstructing stone within the left kidney, numerous bilateral renal cysts within the kidneys with severe atrophy of the kidneys consistent with polycystic kidney disease, sclerosis of the bones likely due to renal osteodystrophy, and cardiomegaly. Labs 5/5/19: H/H 9.3/28.4, MCV 95.9, Plt 265, WBC 4.5. CMP unremarkable aside from BUN/Creat 24/8.11 and glucose 155. Amylase 121, lipase 470. Iron 131, TIBC 197. Ferritin 1851. . Haptoglobin 6. Normal hemoglobin electrophoresis. UGI with KUB was performed on 4/24/19 revealing mild esophageal narrowing at the level of C4-C6 and mild gastroesophageal reflux to the level of the thoracic inlet. Colonoscopy 12/5/18: BBPS score equals 8; large 30 mm lipoma at the hepatic flexure. Mild internal hemorrhoids. Minimal pancolonic diverticulosis. Colonoscopy otherwise normal to the cecum. CT of the abdomen and pelvis without contrast on 6/13/18 revealed significant cardiomegaly, question of hemochromatosis with tense ventricular walls, incidental focal calcification of papillary muscle, barely detectable 25 cc of pericardial effusion less than prior, possible hemochromatosis with liver measuring 80 Hounsfield units and walls of the heart relatively clearly defined as denser than the chambers as additional evidence (target organ), hepatosplenomegaly, atrophic polycystic kidneys, cholecystectomy, and hysterectomy. No bowel abnormalities. EGD 1/10/18 : Mild benign-appearing intrinsic stenosis at 39 cm status post dilatation to 48 [place], granular mucosa at the GE junction, normal stomach and normal examined duodenum. GE junction biopsies showed Worthy's mucosa without dysplasia. Barium swallow 11/27/17 : some dysmotility of esophagus with some mild tertiary contractions and diminished peristalsis. Mildly dilated thoracic esophagus. Mild narrowing at the esophagogastric junction. Barium pill displayed mild stasis at GEJ but then passed rapidly into stomach. Capsule endoscopy 6/13/16 : normal. SB series 4/27/16 : No dilated bowel, mucosal thickening or obstruction. Transit time into the colon was 1 hour. Unremarkable study

## 2024-04-10 ENCOUNTER — COLON (OUTPATIENT)
Dept: URBAN - METROPOLITAN AREA MEDICAL CENTER 19 | Facility: MEDICAL CENTER | Age: 49
End: 2024-04-10
Payer: MEDICARE

## 2024-04-10 DIAGNOSIS — R19.7 ACUTE DIARRHEA: ICD-10-CM

## 2024-04-10 DIAGNOSIS — K63.89 OTHER SPECIFIED DISEASES OF INTESTINE: ICD-10-CM

## 2024-04-10 PROCEDURE — 45380 COLONOSCOPY AND BIOPSY: CPT | Performed by: INTERNAL MEDICINE

## 2024-04-10 RX ORDER — PROMETHAZINE HYDROCHLORIDE 12.5 MG/1
TAKE ONE TABLET BY MOUTH EVERY 12 HOURS AS NEEDED FOR NAUSEA TABLET ORAL
Qty: 45 TABLET | Refills: 4 | Status: ACTIVE | COMMUNITY

## 2024-04-10 RX ORDER — FAMOTIDINE 40 MG/1
1 TABLET AT BEDTIME TABLET, FILM COATED ORAL ONCE A DAY
Qty: 90 TABLETS | Refills: 3 | Status: ACTIVE | COMMUNITY
Start: 2021-09-24

## 2024-04-10 RX ORDER — RIVAROXABAN 15 MG/1
1 TABLET WITH FOOD TABLET, FILM COATED ORAL ONCE A DAY
Status: ACTIVE | COMMUNITY

## 2024-04-10 RX ORDER — GABAPENTIN 300 MG/1
1 CAPSULE CAPSULE ORAL ONCE A DAY
Status: ACTIVE | COMMUNITY

## 2024-04-10 RX ORDER — LINACLOTIDE 290 UG/1
TAKE 1 CAPSULE BY MOUTH AT LEAST 30 MINUTES BEFORE FIRST MEAL OF DAY ON AN EMPTY STOMACH 90 CAPSULE, GELATIN COATED ORAL
Qty: 90 CAPSULE | Refills: 3 | Status: ON HOLD | COMMUNITY

## 2024-04-10 RX ORDER — SEVELAMER CARBONATE 800 MG/1
TABLET, FILM COATED ORAL
Qty: 450 | Refills: 0 | Status: ON HOLD | COMMUNITY
Start: 2016-01-18

## 2024-04-10 RX ORDER — METOPROLOL TARTRATE 25 MG/1
1 TABLET WITH FOOD TABLET, FILM COATED ORAL TWICE A DAY
Status: ON HOLD | COMMUNITY

## 2024-04-10 RX ORDER — CYCLOBENZAPRINE HYDROCHLORIDE 5 MG/1
1 TABLET AT BEDTIME AS NEEDED TABLET, FILM COATED ORAL ONCE A DAY
Status: ACTIVE | COMMUNITY

## 2024-04-10 RX ORDER — COLESEVELAM HYDROCHLORIDE 625 MG/1
2 TABLETS EACH MORNING AND 1 TABLET IN THE EVENING TABLET, FILM COATED ORAL TWICE A DAY
Qty: 90 TABLETS | Refills: 3 | Status: ACTIVE | COMMUNITY
Start: 2024-02-21

## 2024-04-10 RX ORDER — COLESTIPOL HYDROCHLORIDE 1 G/1
1 TABLET TABLET ORAL TWICE DAILY
Qty: 180 TABLETS | Refills: 3 | Status: ACTIVE | COMMUNITY
Start: 2024-02-26

## 2024-04-10 RX ORDER — PANTOPRAZOLE SODIUM 40 MG/1
TAKE 1 TABLET DAILY TABLET, DELAYED RELEASE ORAL ONCE A DAY
Qty: 90 | Refills: 3 | Status: ACTIVE | COMMUNITY
Start: 2021-09-24

## 2024-04-17 ENCOUNTER — OV EP (OUTPATIENT)
Dept: URBAN - METROPOLITAN AREA CLINIC 107 | Facility: CLINIC | Age: 49
End: 2024-04-17